# Patient Record
Sex: FEMALE | Race: WHITE | Employment: UNEMPLOYED
[De-identification: names, ages, dates, MRNs, and addresses within clinical notes are randomized per-mention and may not be internally consistent; named-entity substitution may affect disease eponyms.]

---

## 2017-01-18 ENCOUNTER — OFFICE VISIT (OUTPATIENT)
Dept: OBGYN | Facility: CLINIC | Age: 37
End: 2017-01-18

## 2017-01-18 VITALS
SYSTOLIC BLOOD PRESSURE: 128 MMHG | RESPIRATION RATE: 16 BRPM | WEIGHT: 153 LBS | DIASTOLIC BLOOD PRESSURE: 86 MMHG | HEART RATE: 76 BPM | BODY MASS INDEX: 23.96 KG/M2

## 2017-01-18 DIAGNOSIS — N39.0 URINARY TRACT INFECTION, SITE UNSPECIFIED: ICD-10-CM

## 2017-01-18 DIAGNOSIS — N91.2 AMENORRHEA: Primary | ICD-10-CM

## 2017-01-18 DIAGNOSIS — R35.0 FREQUENT URINATION: ICD-10-CM

## 2017-01-18 DIAGNOSIS — Z32.02 NEGATIVE PREGNANCY TEST: ICD-10-CM

## 2017-01-18 LAB
BILIRUBIN, POC: ABNORMAL
BLOOD URINE, POC: ABNORMAL
CLARITY, POC: CLEAR
COLOR, POC: YELLOW
GLUCOSE URINE, POC: ABNORMAL
KETONES, POC: ABNORMAL
LEUKOCYTE EST, POC: ABNORMAL
NITRITE, POC: ABNORMAL
PH, POC: ABNORMAL
PROTEIN, POC: ABNORMAL
SPECIFIC GRAVITY, POC: ABNORMAL
UROBILINOGEN, POC: ABNORMAL

## 2017-01-18 PROCEDURE — 99213 OFFICE O/P EST LOW 20 MIN: CPT | Performed by: SPECIALIST

## 2017-01-18 PROCEDURE — 81002 URINALYSIS NONAUTO W/O SCOPE: CPT | Performed by: SPECIALIST

## 2017-01-18 PROCEDURE — 81025 URINE PREGNANCY TEST: CPT | Performed by: SPECIALIST

## 2017-01-18 RX ORDER — NITROFURANTOIN 25; 75 MG/1; MG/1
100 CAPSULE ORAL 2 TIMES DAILY
Qty: 14 CAPSULE | Refills: 0 | Status: SHIPPED | OUTPATIENT
Start: 2017-01-18 | End: 2017-01-25

## 2017-01-29 ASSESSMENT — ENCOUNTER SYMPTOMS
COUGH: 0
CONSTIPATION: 0
VOMITING: 0
NAUSEA: 0
EYE PAIN: 0
APNEA: 0
ABDOMINAL PAIN: 0
DIARRHEA: 0
ABDOMINAL DISTENTION: 0

## 2017-02-03 ENCOUNTER — OFFICE VISIT (OUTPATIENT)
Dept: OBGYN | Facility: CLINIC | Age: 37
End: 2017-02-03

## 2017-02-03 VITALS
RESPIRATION RATE: 16 BRPM | HEART RATE: 91 BPM | BODY MASS INDEX: 24.28 KG/M2 | WEIGHT: 155 LBS | SYSTOLIC BLOOD PRESSURE: 153 MMHG | DIASTOLIC BLOOD PRESSURE: 75 MMHG

## 2017-02-03 DIAGNOSIS — N64.52 BREAST DISCHARGE: ICD-10-CM

## 2017-02-03 DIAGNOSIS — N91.2 AMENORRHEA: Primary | ICD-10-CM

## 2017-02-03 DIAGNOSIS — R35.0 FREQUENCY OF URINATION: ICD-10-CM

## 2017-02-03 LAB
BILIRUBIN, POC: NORMAL
BLOOD URINE, POC: NORMAL
CLARITY, POC: CLEAR
COLOR, POC: YELLOW
GLUCOSE URINE, POC: NORMAL
KETONES, POC: NORMAL
LEUKOCYTE EST, POC: NORMAL
NITRITE, POC: NORMAL
PH, POC: NORMAL
PROTEIN, POC: NORMAL
SPECIFIC GRAVITY, POC: NORMAL
UROBILINOGEN, POC: NORMAL

## 2017-02-03 PROCEDURE — 99214 OFFICE O/P EST MOD 30 MIN: CPT | Performed by: ADVANCED PRACTICE MIDWIFE

## 2017-02-03 PROCEDURE — 81025 URINE PREGNANCY TEST: CPT | Performed by: ADVANCED PRACTICE MIDWIFE

## 2017-02-03 PROCEDURE — 81002 URINALYSIS NONAUTO W/O SCOPE: CPT | Performed by: ADVANCED PRACTICE MIDWIFE

## 2017-02-03 ASSESSMENT — PATIENT HEALTH QUESTIONNAIRE - PHQ9
SUM OF ALL RESPONSES TO PHQ9 QUESTIONS 1 & 2: 0
1. LITTLE INTEREST OR PLEASURE IN DOING THINGS: 0
2. FEELING DOWN, DEPRESSED OR HOPELESS: 0
SUM OF ALL RESPONSES TO PHQ QUESTIONS 1-9: 0

## 2017-02-10 ENCOUNTER — OFFICE VISIT (OUTPATIENT)
Dept: OBGYN | Facility: CLINIC | Age: 37
End: 2017-02-10

## 2017-02-10 DIAGNOSIS — R10.2 PELVIC PAIN IN FEMALE: Primary | ICD-10-CM

## 2017-02-10 PROCEDURE — 76856 US EXAM PELVIC COMPLETE: CPT | Performed by: SPECIALIST

## 2017-02-10 PROCEDURE — 99213 OFFICE O/P EST LOW 20 MIN: CPT | Performed by: SPECIALIST

## 2017-02-23 ENCOUNTER — OFFICE VISIT (OUTPATIENT)
Dept: FAMILY MEDICINE CLINIC | Facility: CLINIC | Age: 37
End: 2017-02-23

## 2017-02-23 VITALS
OXYGEN SATURATION: 98 % | SYSTOLIC BLOOD PRESSURE: 124 MMHG | BODY MASS INDEX: 23.96 KG/M2 | HEART RATE: 68 BPM | DIASTOLIC BLOOD PRESSURE: 80 MMHG | WEIGHT: 153 LBS

## 2017-02-23 DIAGNOSIS — M75.81 ROTATOR CUFF TENDINITIS, RIGHT: Primary | ICD-10-CM

## 2017-02-23 PROCEDURE — 96372 THER/PROPH/DIAG INJ SC/IM: CPT

## 2017-02-23 PROCEDURE — 99213 OFFICE O/P EST LOW 20 MIN: CPT

## 2017-02-23 RX ORDER — DEXAMETHASONE SODIUM PHOSPHATE 4 MG/ML
4 INJECTION, SOLUTION INTRA-ARTICULAR; INTRALESIONAL; INTRAMUSCULAR; INTRAVENOUS; SOFT TISSUE ONCE
Qty: 1 ML | Refills: 0
Start: 2017-02-23 | End: 2017-02-23

## 2017-02-23 RX ORDER — METHYLPREDNISOLONE 4 MG/1
TABLET ORAL
Qty: 1 KIT | Refills: 0 | Status: SHIPPED | OUTPATIENT
Start: 2017-02-23 | End: 2017-03-01

## 2017-02-23 RX ORDER — KETOROLAC TROMETHAMINE 30 MG/ML
30 INJECTION, SOLUTION INTRAMUSCULAR; INTRAVENOUS ONCE
Status: COMPLETED | OUTPATIENT
Start: 2017-02-23 | End: 2017-02-23

## 2017-02-23 RX ADMIN — KETOROLAC TROMETHAMINE 30 MG: 30 INJECTION, SOLUTION INTRAMUSCULAR; INTRAVENOUS at 10:08

## 2017-02-23 ASSESSMENT — ENCOUNTER SYMPTOMS: RESPIRATORY NEGATIVE: 1

## 2017-02-24 ENCOUNTER — HOSPITAL ENCOUNTER (OUTPATIENT)
Age: 37
Discharge: HOME | End: 2017-02-24
Payer: COMMERCIAL

## 2017-02-24 ENCOUNTER — HOSPITAL ENCOUNTER (OUTPATIENT)
Dept: GENERAL RADIOLOGY | Age: 37
Discharge: HOME | End: 2017-02-24
Payer: COMMERCIAL

## 2017-02-24 ENCOUNTER — OFFICE VISIT (OUTPATIENT)
Dept: OBGYN | Facility: CLINIC | Age: 37
End: 2017-02-24

## 2017-02-24 VITALS
HEART RATE: 95 BPM | WEIGHT: 152 LBS | BODY MASS INDEX: 23.81 KG/M2 | SYSTOLIC BLOOD PRESSURE: 142 MMHG | RESPIRATION RATE: 16 BRPM | DIASTOLIC BLOOD PRESSURE: 80 MMHG

## 2017-02-24 DIAGNOSIS — M75.81 ROTATOR CUFF TENDINITIS, RIGHT: ICD-10-CM

## 2017-02-24 DIAGNOSIS — E28.2 PCO (POLYCYSTIC OVARIES): Primary | ICD-10-CM

## 2017-02-24 PROCEDURE — 73030 X-RAY EXAM OF SHOULDER: CPT

## 2017-02-24 PROCEDURE — 99213 OFFICE O/P EST LOW 20 MIN: CPT | Performed by: SPECIALIST

## 2017-02-24 PROCEDURE — 73030 X-RAY EXAM OF SHOULDER: CPT | Performed by: RADIOLOGY

## 2017-02-24 RX ORDER — METFORMIN HYDROCHLORIDE 500 MG/1
500 TABLET, EXTENDED RELEASE ORAL
Qty: 30 TABLET | Refills: 5 | Status: SHIPPED | OUTPATIENT
Start: 2017-02-24 | End: 2017-07-31 | Stop reason: ALTCHOICE

## 2017-02-24 ASSESSMENT — ENCOUNTER SYMPTOMS
EYE PAIN: 0
VOMITING: 0
APNEA: 0
NAUSEA: 0
ABDOMINAL DISTENTION: 0
DIARRHEA: 0
ABDOMINAL PAIN: 0
COUGH: 0
CONSTIPATION: 0

## 2017-03-09 ENCOUNTER — OFFICE VISIT (OUTPATIENT)
Dept: FAMILY MEDICINE CLINIC | Facility: CLINIC | Age: 37
End: 2017-03-09

## 2017-03-09 VITALS
DIASTOLIC BLOOD PRESSURE: 80 MMHG | OXYGEN SATURATION: 98 % | SYSTOLIC BLOOD PRESSURE: 130 MMHG | HEART RATE: 78 BPM | BODY MASS INDEX: 24.43 KG/M2 | WEIGHT: 156 LBS

## 2017-03-09 DIAGNOSIS — M25.511 CHRONIC RIGHT SHOULDER PAIN: Primary | ICD-10-CM

## 2017-03-09 DIAGNOSIS — G89.29 CHRONIC RIGHT SHOULDER PAIN: Primary | ICD-10-CM

## 2017-03-09 PROCEDURE — 99212 OFFICE O/P EST SF 10 MIN: CPT

## 2017-03-09 ASSESSMENT — ENCOUNTER SYMPTOMS: RESPIRATORY NEGATIVE: 1

## 2017-03-31 ENCOUNTER — OFFICE VISIT (OUTPATIENT)
Dept: FAMILY MEDICINE CLINIC | Age: 37
End: 2017-03-31
Payer: COMMERCIAL

## 2017-03-31 VITALS
BODY MASS INDEX: 24.28 KG/M2 | OXYGEN SATURATION: 96 % | HEART RATE: 74 BPM | WEIGHT: 155 LBS | DIASTOLIC BLOOD PRESSURE: 78 MMHG | SYSTOLIC BLOOD PRESSURE: 126 MMHG

## 2017-03-31 DIAGNOSIS — M25.511 CHRONIC RIGHT SHOULDER PAIN: Primary | ICD-10-CM

## 2017-03-31 DIAGNOSIS — M75.81 ROTATOR CUFF TENDINITIS, RIGHT: ICD-10-CM

## 2017-03-31 DIAGNOSIS — G89.29 CHRONIC RIGHT SHOULDER PAIN: Primary | ICD-10-CM

## 2017-03-31 PROCEDURE — 99212 OFFICE O/P EST SF 10 MIN: CPT

## 2017-03-31 RX ORDER — IBUPROFEN 600 MG/1
600 TABLET ORAL EVERY 6 HOURS PRN
Qty: 120 TABLET | Refills: 5 | Status: ON HOLD | OUTPATIENT
Start: 2017-03-31 | End: 2017-12-19 | Stop reason: HOSPADM

## 2017-07-24 ENCOUNTER — OFFICE VISIT (OUTPATIENT)
Dept: FAMILY MEDICINE CLINIC | Age: 37
End: 2017-07-24
Payer: COMMERCIAL

## 2017-07-24 VITALS
SYSTOLIC BLOOD PRESSURE: 116 MMHG | TEMPERATURE: 97.2 F | DIASTOLIC BLOOD PRESSURE: 78 MMHG | OXYGEN SATURATION: 97 % | BODY MASS INDEX: 22.96 KG/M2 | WEIGHT: 146.6 LBS | HEART RATE: 73 BPM

## 2017-07-24 DIAGNOSIS — R10.2 PELVIC PAIN IN FEMALE: ICD-10-CM

## 2017-07-24 DIAGNOSIS — M19.90 ARTHRITIS: Primary | ICD-10-CM

## 2017-07-24 DIAGNOSIS — L72.3 SEBACEOUS CYST: ICD-10-CM

## 2017-07-24 PROCEDURE — 90732 PPSV23 VACC 2 YRS+ SUBQ/IM: CPT

## 2017-07-24 PROCEDURE — 90471 IMMUNIZATION ADMIN: CPT

## 2017-07-24 PROCEDURE — 99212 OFFICE O/P EST SF 10 MIN: CPT

## 2017-07-26 ENCOUNTER — HOSPITAL ENCOUNTER (OUTPATIENT)
Age: 37
Discharge: HOME | End: 2017-07-26
Payer: COMMERCIAL

## 2017-07-26 ENCOUNTER — HOSPITAL ENCOUNTER (OUTPATIENT)
Dept: GENERAL RADIOLOGY | Age: 37
Discharge: HOME | End: 2017-07-26
Payer: COMMERCIAL

## 2017-07-26 DIAGNOSIS — M19.90 ARTHRITIS: ICD-10-CM

## 2017-07-26 DIAGNOSIS — R10.2 PELVIC PAIN IN FEMALE: ICD-10-CM

## 2017-07-26 LAB
FOLLICLE STIMULATING HORMONE: 14.9 U/L (ref 1.7–21.5)
LH: 23.6 U/L (ref 1–95.6)

## 2017-07-26 PROCEDURE — 83001 ASSAY OF GONADOTROPIN (FSH): CPT

## 2017-07-26 PROCEDURE — 83002 ASSAY OF GONADOTROPIN (LH): CPT

## 2017-07-26 PROCEDURE — 73562 X-RAY EXAM OF KNEE 3: CPT

## 2017-07-26 PROCEDURE — 36415 COLL VENOUS BLD VENIPUNCTURE: CPT

## 2017-07-31 ENCOUNTER — HOSPITAL ENCOUNTER (OUTPATIENT)
Age: 37
Setting detail: SPECIMEN
Discharge: HOME | End: 2017-07-31
Payer: COMMERCIAL

## 2017-07-31 DIAGNOSIS — Z01.419 WELL FEMALE EXAM WITH ROUTINE GYNECOLOGICAL EXAM: ICD-10-CM

## 2017-07-31 DIAGNOSIS — Z11.51 SPECIAL SCREENING EXAMINATION FOR HUMAN PAPILLOMAVIRUS (HPV): ICD-10-CM

## 2017-07-31 PROCEDURE — G0145 SCR C/V CYTO,THINLAYER,RESCR: HCPCS

## 2017-07-31 PROCEDURE — 87624 HPV HI-RISK TYP POOLED RSLT: CPT

## 2017-08-01 LAB
HPV SAMPLE: NORMAL
HPV SOURCE: NORMAL
HPV, GENOTYPE 16: NOT DETECTED
HPV, GENOTYPE 18: NOT DETECTED
HPV, HIGH RISK OTHER: NOT DETECTED
HPV, INTERPRETATION: NORMAL

## 2017-08-02 LAB — CYTOLOGY REPORT: NORMAL

## 2017-08-07 ENCOUNTER — HOSPITAL ENCOUNTER (OUTPATIENT)
Dept: ULTRASOUND IMAGING | Age: 37
Discharge: HOME | End: 2017-08-07
Payer: COMMERCIAL

## 2017-08-07 DIAGNOSIS — N94.6 SEVERE DYSMENORRHEA: ICD-10-CM

## 2017-08-07 DIAGNOSIS — N94.6 DYSMENORRHEA: ICD-10-CM

## 2017-08-07 PROCEDURE — 76856 US EXAM PELVIC COMPLETE: CPT

## 2017-08-07 PROCEDURE — 76830 TRANSVAGINAL US NON-OB: CPT

## 2017-08-16 ENCOUNTER — OFFICE VISIT (OUTPATIENT)
Dept: FAMILY MEDICINE CLINIC | Age: 37
End: 2017-08-16
Payer: COMMERCIAL

## 2017-08-16 VITALS
BODY MASS INDEX: 23.18 KG/M2 | OXYGEN SATURATION: 96 % | WEIGHT: 148 LBS | SYSTOLIC BLOOD PRESSURE: 114 MMHG | DIASTOLIC BLOOD PRESSURE: 72 MMHG | HEART RATE: 74 BPM

## 2017-08-16 DIAGNOSIS — G89.29 CHRONIC KNEE PAIN, UNSPECIFIED LATERALITY: Primary | ICD-10-CM

## 2017-08-16 DIAGNOSIS — M25.569 CHRONIC KNEE PAIN, UNSPECIFIED LATERALITY: Primary | ICD-10-CM

## 2017-08-16 PROCEDURE — 99212 OFFICE O/P EST SF 10 MIN: CPT

## 2017-08-28 ENCOUNTER — TELEPHONE (OUTPATIENT)
Dept: OBGYN CLINIC | Age: 37
End: 2017-08-28

## 2017-08-28 DIAGNOSIS — N64.4 BREAST TENDERNESS IN FEMALE: ICD-10-CM

## 2017-08-28 DIAGNOSIS — N63.10 LUMP OF RIGHT BREAST: Primary | ICD-10-CM

## 2017-09-05 ENCOUNTER — TELEPHONE (OUTPATIENT)
Dept: OBGYN CLINIC | Age: 37
End: 2017-09-05

## 2017-09-05 DIAGNOSIS — R35.0 FREQUENCY OF URINATION: Primary | ICD-10-CM

## 2017-09-15 ENCOUNTER — HOSPITAL ENCOUNTER (OUTPATIENT)
Dept: WOMENS IMAGING | Age: 37
Discharge: HOME | End: 2017-09-15
Payer: COMMERCIAL

## 2017-09-15 ENCOUNTER — HOSPITAL ENCOUNTER (OUTPATIENT)
Age: 37
Discharge: HOME | End: 2017-09-15
Payer: COMMERCIAL

## 2017-09-15 DIAGNOSIS — N63.0 LUMP IN FEMALE BREAST: ICD-10-CM

## 2017-09-15 DIAGNOSIS — N63.10 LUMP OF RIGHT BREAST: ICD-10-CM

## 2017-09-15 DIAGNOSIS — N64.4 BREAST TENDERNESS IN FEMALE: ICD-10-CM

## 2017-09-15 DIAGNOSIS — R35.0 FREQUENCY OF URINATION: ICD-10-CM

## 2017-09-15 LAB
-: ABNORMAL
AMORPHOUS: ABNORMAL
BACTERIA: ABNORMAL
BILIRUBIN URINE: NEGATIVE
CASTS UA: ABNORMAL /LPF
COLOR: YELLOW
COMMENT UA: ABNORMAL
CRYSTALS, UA: ABNORMAL /HPF
EPITHELIAL CELLS UA: ABNORMAL /HPF
GLUCOSE URINE: NEGATIVE
KETONES, URINE: NEGATIVE
LEUKOCYTE ESTERASE, URINE: ABNORMAL
MUCUS: ABNORMAL
NITRITE, URINE: NEGATIVE
OTHER OBSERVATIONS UA: ABNORMAL
PH UA: 6.5 (ref 5–8)
PROTEIN UA: NEGATIVE
RBC UA: ABNORMAL /HPF
RENAL EPITHELIAL, UA: ABNORMAL /HPF
SPECIFIC GRAVITY UA: 1.02 (ref 1–1.03)
TRICHOMONAS: ABNORMAL
TURBIDITY: ABNORMAL
URINE HGB: NEGATIVE
UROBILINOGEN, URINE: NORMAL
WBC UA: ABNORMAL /HPF
YEAST: ABNORMAL

## 2017-09-15 PROCEDURE — 87086 URINE CULTURE/COLONY COUNT: CPT

## 2017-09-15 PROCEDURE — 81001 URINALYSIS AUTO W/SCOPE: CPT

## 2017-09-15 PROCEDURE — G0279 TOMOSYNTHESIS, MAMMO: HCPCS

## 2017-09-15 PROCEDURE — 76642 ULTRASOUND BREAST LIMITED: CPT

## 2017-09-16 LAB
CULTURE: NORMAL
CULTURE: NORMAL
Lab: NORMAL
SPECIMEN DESCRIPTION: NORMAL
SPECIMEN DESCRIPTION: NORMAL
STATUS: NORMAL

## 2017-10-31 ENCOUNTER — OFFICE VISIT (OUTPATIENT)
Dept: OBGYN CLINIC | Age: 37
End: 2017-10-31
Payer: COMMERCIAL

## 2017-10-31 VITALS
HEART RATE: 78 BPM | WEIGHT: 143 LBS | BODY MASS INDEX: 22.44 KG/M2 | SYSTOLIC BLOOD PRESSURE: 122 MMHG | DIASTOLIC BLOOD PRESSURE: 78 MMHG | HEIGHT: 67 IN

## 2017-10-31 DIAGNOSIS — N80.9 ENDOMETRIOSIS DETERMINED BY LAPAROSCOPY: Primary | ICD-10-CM

## 2017-10-31 DIAGNOSIS — N94.6 DYSMENORRHEA: ICD-10-CM

## 2017-10-31 DIAGNOSIS — N92.6 IRREGULAR BLEEDING: ICD-10-CM

## 2017-10-31 DIAGNOSIS — N80.30 ENDOMETRIOSIS OF PERITONEUM: ICD-10-CM

## 2017-10-31 DIAGNOSIS — R10.2 PELVIC PAIN IN FEMALE: ICD-10-CM

## 2017-10-31 DIAGNOSIS — N94.10 DYSPAREUNIA, FEMALE: ICD-10-CM

## 2017-10-31 PROCEDURE — G8427 DOCREV CUR MEDS BY ELIG CLIN: HCPCS | Performed by: OBSTETRICS & GYNECOLOGY

## 2017-10-31 PROCEDURE — 4004F PT TOBACCO SCREEN RCVD TLK: CPT | Performed by: OBSTETRICS & GYNECOLOGY

## 2017-10-31 PROCEDURE — G8484 FLU IMMUNIZE NO ADMIN: HCPCS | Performed by: OBSTETRICS & GYNECOLOGY

## 2017-10-31 PROCEDURE — G8420 CALC BMI NORM PARAMETERS: HCPCS | Performed by: OBSTETRICS & GYNECOLOGY

## 2017-10-31 PROCEDURE — 99214 OFFICE O/P EST MOD 30 MIN: CPT | Performed by: OBSTETRICS & GYNECOLOGY

## 2017-10-31 RX ORDER — LORAZEPAM 1 MG/1
TABLET ORAL
Refills: 0 | COMMUNITY
Start: 2017-10-18 | End: 2018-04-10 | Stop reason: ALTCHOICE

## 2017-10-31 NOTE — PROGRESS NOTES
Pressure Maternal Grandmother     High Cholesterol Maternal Grandmother     Vision Loss Maternal Grandmother     Arthritis Mother     High Blood Pressure Mother     High Cholesterol Mother     Arthritis Father     High Blood Pressure Father     Stroke Father     Miscarriages / [place] Sister     Arthritis Maternal Grandfather     Asthma Maternal Grandfather     Cancer Maternal Grandfather     Heart Disease Maternal Grandfather     High Blood Pressure Maternal Grandfather     High Cholesterol Maternal Grandfather     Arthritis Paternal Grandmother     Cancer Paternal Grandmother     Heart Disease Paternal Grandmother     High Blood Pressure Paternal Grandmother     Arthritis Paternal Grandfather     Cancer Paternal Grandfather     Heart Disease Paternal Grandfather     High Blood Pressure Paternal Grandfather          Social History   Substance Use Topics    Smoking status: Current Every Day Smoker     Packs/day: 1.00     Years: 30.00     Types: Cigarettes    Smokeless tobacco: Never Used    Alcohol use 0.0 oz/week      Comment: rare         MEDICATIONS:  Current Outpatient Prescriptions   Medication Sig Dispense Refill    ibuprofen (ADVIL;MOTRIN) 600 MG tablet Take 1 tablet by mouth every 6 hours as needed for Pain 120 tablet 5    LORazepam (ATIVAN) 1 MG tablet take 1 tablet by mouth 30 TO 40 MINUTES BEFORE APPOINTMENT  0     No current facility-administered medications for this visit.       Facility-Administered Medications Ordered in Other Visits   Medication Dose Route Frequency Provider Last Rate Last Dose    lactated ringers infusion   Intravenous Continuous Bear Reed MD        sodium chloride flush 0.9 % injection 10 mL  10 mL Intravenous 2 times per day Bear Reed MD        sodium chloride flush 0.9 % injection 10 mL  10 mL Intravenous PRN Bear Reed MD        ceFAZolin (ANCEF) 1 g IVPB 50mL minibag D5W  1 g Intravenous Once Pam Baker MD NEGATIVE NEG    pH, UA 6.5 5.0 - 8.0    Protein, UA NEGATIVE NEG    Urobilinogen, Urine Normal NORM    Nitrite, Urine NEGATIVE NEG    Leukocyte Esterase, Urine SMALL (A) NEG    Urinalysis Comments NOT REPORTED    Microscopic Urinalysis   Result Value Ref Range    -          WBC, UA 2 TO 5 /HPF    RBC, UA 0 TO 2 /HPF    Casts UA NOT REPORTED /LPF    Crystals UA NOT REPORTED NONE /HPF    Epithelial Cells UA 10 TO 20 /HPF    Renal Epithelial, Urine NOT REPORTED 0 /HPF    Bacteria, UA FEW (A) NONE    Mucus, UA NOT REPORTED NONE    Trichomonas, UA NOT REPORTED NONE    Amorphous, UA NOT REPORTED NONE    Other Observations UA NOT REPORTED NREQ    Yeast, UA NOT REPORTED NONE     Cytology Report 07/31/2017  9:39 AM - [facility]   (NOTE)   [id number]   [facility]   [facility] PATHOLOG[facility]   ANATOMIC PATHOLOGY   [address] [place] [address], [place] [address]. [place], [address]   [phone]   Fax: [phone]   GYNECOLOGIC CYTOLOGY REPORT     Patient Name: Faith Murdock   MR#: [id number]   Specimen #[id number]   Source:   1: Cervical material, (ThinPrep vial, Imaging-assisted review)     Clinical History   Z01.419 Routine gyn exam without abnormal findings   Co-Test:  ThinPrep Pap with high risk HPV testing   Z11.51 Encounter for screening for HPV   LMP:  7/16/17   INTERPRETATION     Cervical material, (ThinPrep vial, Imaging-assisted review):   Specimen Adequacy:       Satisfactory for evaluation.       - Endocervical/transformation zone component present. Descriptive Diagnosis:       Negative for intraepithelial lesion or malignancy. Comments:       High Risk HPV testing was ordered. Cytotechnologist:   OMER Garcia CD(ASCP)   **Electronically Signed Out**   kemal/8/2/2017           Procedure/Addendum   HPV Procedure Report     Date Ordered:     8/1/2017     Status: Signed   Out       Date Complete:     8/1/2017     By: OMER Adame(ASCP)       Date Reported:     8/2/2017     INTERPRETATION   Roche HPV DNA High Risk                                   HPV Sample               Thin Prep                    (Ref Range)   HPV Type 16               Not Detected                    (Not   Detected)   HPV Type 18               Not Detected                    (Not   Detected)   Other High Risk HPV     Not Detected                    (Not Detected)       This test amplifies and detects DNA of 14 high-risk HPV types   associated with cervical cancer and its precursor lesions (HPV types   16, 18, 31, 33, 35, 39, 45, 51, 52, 56, 58, 59, 66, and 68). Sensitivity may be affected by specimen collection methods, stage of   infection, and the presence of interfering substances.  Results should   be interpreted in conjunction with other available laboratory and   clinical data.  A negative high-risk HPV result does not exclude the   possibility of future cytologic HSIL or underlying CIN2-3 or cancer. This test is intended for medical purposes only and is not valid for   the evaluation of suspected sexual abuse or for other forensic   purposes.                Assessment:  1. Endometriosis determined by laparoscopy     2. Irregular bleeding     3. Dysmenorrhea     4. Dyspareunia, female     5. Pelvic pain in female       Chief Complaint   Patient presents with    Follow-up     sono/pelvic pain         Patient Active Problem List    Diagnosis Date Noted    Chronic knee pain 08/16/2017    Chronic right shoulder pain 03/09/2017    Amenorrhea 02/03/2017    Breast discharge 02/03/2017    Olecranon bursitis of right elbow 09/20/2016    Sebaceous cyst 08/26/2016    Ganglion cyst 08/18/2016    Wrist pain, chronic 08/12/2016    Chronic right-sided low back pain with right-sided sciatica 08/12/2016    Arthralgia of both elbows 06/13/2016    Arthritis 06/13/2016    Pelvic pain in female 05/19/2015       PLAN:  No Follow-up on file.   EMb Hscope 2-4 weeks  Abstain  Lupron initiation  Pt to decide for fertility or definitive surgery RBA reviewed  Return to the office in 2-4 weeks. Counseled on preventative health maintenance follow-up. Smoking cessation reviewed  No orders of the defined types were placed in this encounter. Patient was seen with total face to face time of 25 minutes. More than 50% of this visit was counseling and education regarding The primary encounter diagnosis was Endometriosis determined by laparoscopy. Diagnoses of Irregular bleeding, Dysmenorrhea, Dyspareunia, female, and Pelvic pain in female were also pertinent to this visit. and Follow-up (sono/pelvic pain)   as well as  counseling on preventative health maintenance follow-up.

## 2017-12-05 ENCOUNTER — HOSPITAL ENCOUNTER (OUTPATIENT)
Age: 37
Setting detail: SPECIMEN
Discharge: HOME | End: 2017-12-05
Payer: COMMERCIAL

## 2017-12-05 ENCOUNTER — PROCEDURE VISIT (OUTPATIENT)
Dept: OBGYN CLINIC | Age: 37
End: 2017-12-05
Payer: COMMERCIAL

## 2017-12-05 VITALS
HEIGHT: 67 IN | WEIGHT: 150 LBS | RESPIRATION RATE: 20 BRPM | DIASTOLIC BLOOD PRESSURE: 78 MMHG | SYSTOLIC BLOOD PRESSURE: 116 MMHG | BODY MASS INDEX: 23.54 KG/M2 | HEART RATE: 78 BPM

## 2017-12-05 DIAGNOSIS — N94.10 DYSPAREUNIA, FEMALE: ICD-10-CM

## 2017-12-05 DIAGNOSIS — N94.6 DYSMENORRHEA: Primary | ICD-10-CM

## 2017-12-05 DIAGNOSIS — N80.30 ENDOMETRIOSIS OF PERITONEUM: ICD-10-CM

## 2017-12-05 DIAGNOSIS — Z32.02 NEGATIVE PREGNANCY TEST: ICD-10-CM

## 2017-12-05 DIAGNOSIS — R10.2 PELVIC PAIN IN FEMALE: ICD-10-CM

## 2017-12-05 DIAGNOSIS — N80.9 ENDOMETRIOSIS DETERMINED BY LAPAROSCOPY: ICD-10-CM

## 2017-12-05 DIAGNOSIS — N92.6 IRREGULAR BLEEDING: ICD-10-CM

## 2017-12-05 LAB
CONTROL: NORMAL
PREGNANCY TEST URINE, POC: NEGATIVE

## 2017-12-05 PROCEDURE — 88305 TISSUE EXAM BY PATHOLOGIST: CPT

## 2017-12-05 PROCEDURE — 58558 HYSTEROSCOPY BIOPSY: CPT | Performed by: OBSTETRICS & GYNECOLOGY

## 2017-12-05 PROCEDURE — 81025 URINE PREGNANCY TEST: CPT | Performed by: OBSTETRICS & GYNECOLOGY

## 2017-12-05 RX ORDER — MEDROXYPROGESTERONE ACETATE 2.5 MG/1
2.5 TABLET ORAL DAILY
Qty: 30 TABLET | Refills: 1 | Status: SHIPPED | OUTPATIENT
Start: 2017-12-05 | End: 2018-03-13

## 2017-12-05 NOTE — PROGRESS NOTES
in female 05/19/2015     Priority: High    Chronic knee pain 08/16/2017    Chronic right shoulder pain 03/09/2017    Amenorrhea 02/03/2017    Breast discharge 02/03/2017    Olecranon bursitis of right elbow 09/20/2016    Sebaceous cyst 08/26/2016    Ganglion cyst 08/18/2016    Wrist pain, chronic 08/12/2016    Chronic right-sided low back pain with right-sided sciatica 08/12/2016    Arthralgia of both elbows 06/13/2016    Arthritis 06/13/2016         Procedure Note:  After voiding, the patient returned to the exam room where she was placed in the dorsal- lithotomy position. A bimanual examination was performed without pathological changes from her most recent history and physical examination; . A bi-valve speculum was then placed into the vaginal vault allowing visualization of the cervix. The surgical field was then cleansed with betadine. Under direct visualization an allis was placed on the anterior cervical lip. The uterus was sounded to 7 cm. A 3mm Flexible Hystreroscope was then placed thru the endocervical canal and into the endometrial cavity without any complications. A total of 20 ml of normal saline was instilled to aid in visualization of the endometrial anatomy, video was completed. During the hysteroscopic procedure an endometrial sampling was performed without incident. Pathology is pending. The patient tolerated the procedure well, the Allis was removed off the anterior cervical lip and there was noted to be adequate hemostasis. The patient was given restrictions and will follow-up in the office in 10-14 days. She will report any abdominal pain, heavy vaginal bleeding or a temperature of greater than 100.4. Hysteroscopic Findings:  No septums polyps or fibroids. BL Ostia visualized    Assessment:  1. Dysmenorrhea  [place] HYSTEROSCOPY,W/ENDO BX    Specimen to Pathology Outpatient    medroxyPROGESTERone (PROVERA) 2.5 MG tablet   2.  Negative pregnancy test  POCT urine pregnancy 3. Endometriosis determined by laparoscopy     4. Irregular bleeding     5. Dyspareunia, female     6. Pelvic pain in female     7. Endometriosis of peritoneum       Patient Active Problem List    Diagnosis Date Noted    Endometriosis of peritoneum 9/2015 L-Scope Stage 3-4 10/31/2017     Priority: High    Pelvic pain in female 05/19/2015     Priority: High    Chronic knee pain 08/16/2017    Chronic right shoulder pain 03/09/2017    Amenorrhea 02/03/2017    Breast discharge 02/03/2017    Olecranon bursitis of right elbow 09/20/2016    Sebaceous cyst 08/26/2016    Ganglion cyst 08/18/2016    Wrist pain, chronic 08/12/2016    Chronic right-sided low back pain with right-sided sciatica 08/12/2016    Arthralgia of both elbows 06/13/2016    Arthritis 06/13/2016           Recommendations:  Return to the office for follow-up in 2-4 weeks to review the pathology of the biopsy and for further recommendations. Patient is considering Lupron therapy for 3-5 months then Laparoscopy with FOI and STERLING and chromopertubation. Pt wishes progesterone to stop her menses until her lupron is given. RBA reviewed. Patient to abstain or barrier recs.     Orders Placed This Encounter   Procedures    Specimen to Pathology Outpatient     Order Specific Question:   PREVIOUS BIOPSY     Answer:   No     Order Specific Question:   PREOP DIAGNOSIS     Answer:   dysmennorhea     Order Specific Question:   FROZEN SECTION - NO OR YES/SPECIMEN     Answer:   No    POCT urine pregnancy    [place] HYSTEROSCOPY,W/ENDO BX         Nonadia Ped, DO

## 2017-12-07 LAB — SURGICAL PATHOLOGY REPORT: NORMAL

## 2017-12-18 ENCOUNTER — TELEPHONE (OUTPATIENT)
Dept: OBGYN CLINIC | Age: 37
End: 2017-12-18

## 2017-12-18 NOTE — TELEPHONE ENCOUNTER
Requesting a call from nurse she is on Provera and has questions. She is waiting on Lupron to come in.

## 2017-12-19 ENCOUNTER — HOSPITAL ENCOUNTER (OUTPATIENT)
Age: 37
Setting detail: OUTPATIENT SURGERY
Discharge: HOME | End: 2017-12-19
Attending: SURGERY | Admitting: SURGERY
Payer: COMMERCIAL

## 2017-12-19 VITALS
HEART RATE: 73 BPM | SYSTOLIC BLOOD PRESSURE: 107 MMHG | BODY MASS INDEX: 22.76 KG/M2 | HEIGHT: 67 IN | RESPIRATION RATE: 16 BRPM | WEIGHT: 145 LBS | OXYGEN SATURATION: 100 % | TEMPERATURE: 98.1 F | DIASTOLIC BLOOD PRESSURE: 85 MMHG

## 2017-12-19 PROCEDURE — A6402 STERILE GAUZE <= 16 SQ IN: HCPCS | Performed by: SURGERY

## 2017-12-19 PROCEDURE — A6258 TRANSPARENT FILM >16<=48 IN: HCPCS | Performed by: SURGERY

## 2017-12-19 PROCEDURE — 3600000012 HC SURGERY LEVEL 2 ADDTL 15MIN: Performed by: SURGERY

## 2017-12-19 PROCEDURE — 3600000002 HC SURGERY LEVEL 2 BASE: Performed by: SURGERY

## 2017-12-19 PROCEDURE — 7100000010 HC PHASE II RECOVERY - FIRST 15 MIN: Performed by: SURGERY

## 2017-12-19 PROCEDURE — 2500000003 HC RX 250 WO HCPCS: Performed by: SURGERY

## 2017-12-19 PROCEDURE — 88304 TISSUE EXAM BY PATHOLOGIST: CPT

## 2017-12-19 RX ORDER — CEPHALEXIN 500 MG/1
500 CAPSULE ORAL 3 TIMES DAILY
Qty: 21 CAPSULE | Refills: 0 | Status: SHIPPED | OUTPATIENT
Start: 2017-12-19 | End: 2017-12-26

## 2017-12-19 RX ORDER — OXYCODONE HYDROCHLORIDE AND ACETAMINOPHEN 5; 325 MG/1; MG/1
1 TABLET ORAL EVERY 6 HOURS PRN
Qty: 30 TABLET | Refills: 0 | Status: SHIPPED | OUTPATIENT
Start: 2017-12-19 | End: 2017-12-26

## 2017-12-19 RX ORDER — LIDOCAINE HYDROCHLORIDE AND EPINEPHRINE BITARTRATE 20; .01 MG/ML; MG/ML
INJECTION, SOLUTION SUBCUTANEOUS PRN
Status: DISCONTINUED | OUTPATIENT
Start: 2017-12-19 | End: 2017-12-19 | Stop reason: HOSPADM

## 2017-12-19 ASSESSMENT — PAIN SCALES - GENERAL: PAINLEVEL_OUTOF10: 0

## 2017-12-19 ASSESSMENT — PAIN DESCRIPTION - DESCRIPTORS: DESCRIPTORS: ACHING

## 2017-12-19 ASSESSMENT — PAIN - FUNCTIONAL ASSESSMENT: PAIN_FUNCTIONAL_ASSESSMENT: 0-10

## 2017-12-19 NOTE — FLOWSHEET NOTE
12/19/17 0751   Encounter Summary   Services provided to: Patient and family together   Referral/Consult From: [address] [facility] Significant other;Family members   Continue Visiting (12/19/17)   Complexity of Encounter Moderate   Length of Encounter 15 minutes   Spiritual Assessment Completed Yes   Routine   Type Pre-procedure   Assessment Coping; Anxious; Approachable   Intervention Sustaining presence/ Ministry of presence; Active listening;Nurtured hope   Outcome Expressed gratitude;Comfort   Spiritual/Pentecostal   Type Spiritual support

## 2017-12-19 NOTE — OP NOTE
Operative Note      PATIENT NAME:  Cheko Gomez RECORD NO. [id number]                DATE OF PROCEDURE:  12/19/2017  PRIMARY CARE PHYSICIAN:  Abebe Cuevas MD  PREOPERATIVE DIAGNOSIS:    1.  Subcutaneous nodule, upper back   2. Cyst, right upper thigh  POSTOPERATIVE DIAGNOSIS:  Same  PROCEDURE PERFORMED:    1. Excision subcutaneous nodule, upper back, 1 cm  2. Excision cyst, right thigh, 1 cm   SURGEON:  Rodrick Townsend DO, FACS  ANESTHESIA:  Local  BLOOD LOSS:  Less than 5 ml. SPECIMENS:    1.  Subcutaneous nodule, upper back  2. Cyst, right thigh  COMPLICATIONS:  None immediately appreciated. DISCUSSION:  Brook Desir is a 30y.o. year old female who presented with signs and symptoms of nodule of the upper back causing discomfort. She also complains of cyst in the upper right thigh causing discomfort and drainage. After history and physical examination was performed potential diagnostic and therapeutic modalities were discussed with the patient. Operative and nonoperative management was discussed. Risks, complications, benefits were reviewed. She was given the opportunity to ask questions. Once answered an informed consent was obtained. The patient was brought to the operating room on 12/19/2017. PROCEDURE:   The patient was taken to the operating room and placed on the operating table in the prone position. The upper back was prepped and draped in usual sterile fashion. Timeout was called. After infiltration of lidocaine for local analgesia a transverse incision was made over the palpable lesion and we dissected through the subtenons tissue using electrocautery as well as sharp dissection. The palpable nodule and surrounding tissue were excised. It measured about a centimeter in diameter. The operative field was noted to be hemostatic. It was irrigated and dried.   Subcutaneous tissue was approximated with 3-0 Vicryl and the skin was closed with 2-0 nylon in interrupted fashion. Incision was washed and dried sterilely and a sterile dressing was applied. The patient was then placed in the supine position. The upper right thigh was prepped and draped in usual sterile fashion. Timeout was called. After infiltration of lidocaine for local analgesia an elliptical incision was made to encompass the infected cyst.  We dissected through the subtenons tissue using electrocautery. The cyst and surrounding tissue were removed. The cyst measured about a centimeter in diameter. The skin and subcutaneous tissue was mobilized to allow for tension-free closure. The area was noted to be hemostatic. Subcutaneous tissue was approximated with 3-0 Vicryl sutures and the skin was closed with 2-0 nylon in interrupted fashion. The incision was washed and dried sterilely and a sterile dressing was applied. The patient was taken to recovery in stable condition. Instruments, needles, and sponges were counted twice at the end of the procedure and counts were correct.

## 2017-12-19 NOTE — H&P
tablet 1    ibuprofen (ADVIL;MOTRIN) 600 MG tablet Take 1 tablet by mouth every 6 hours as needed for Pain 120 tablet 5    LORazepam (ATIVAN) 1 MG tablet take 1 tablet by mouth 30 TO 40 MINUTES BEFORE APPOINTMENT  0        General health:  Fairly good. No fever or chills. Skin:  See HPI. HEENT:  No headache, epistaxis or sore throat. Neck:  No pain, stiffness or masses. Cardiovascular/Respiratory system:  No chest pain, palpitation or shortness of breath. Gastrointestinal tract:  Complains of abdominal cramping from menses. nausea, vomiting, diarrhea or constipation. Genitourinary:  No burning on micturition. No hesitancy, urgency, frequency or discoloration of urine. Locomotor:  No bone or joint pains. No swelling. Neuropsychiatric:  No referable complaints. GENERAL PHYSICAL EXAM:     Vitals: /85   Pulse 69   Temp 98.2 °F (36.8 °C) (Oral)   Resp 16   Ht 5' 7\" (1.702 m)   Wt 145 lb (65.8 kg)   LMP 11/21/2017 (Exact Date)   SpO2 99%   BMI 22.71 kg/m²  Body mass index is 22.71 kg/m². GENERAL APPEARANCE:   Destiny Newman is 30 y.o.,  female, not obese, nourished, conscious, alert. Does not appear to be distress or pain at this time. SKIN:  Warm, dry, no cyanosis or jaundice. Raised semi firm areas to left upper back, non tender;  Right redned sl raised area to right upper thigh, tender. HEAD:  Normocephalic, atraumatic, no swelling or tenderness. EYES:  Pupils equal, reactive to light. Eyes wander separately at times. EARS:  No discharge, no marked hearing loss. NOSE:  No rhinorrhea, epistaxis or septal deformity. THROAT:  Not congested. No ulceration bleeding or discharge.                   NECK:  No stiffness, trachea central.  No palpable masses or L.N.                 CHEST:  Symmetrical and equal on expansion. HEART:  RRR S1 > S2. No audible murmurs or gallops. LUNGS:  Equal on expansion, normal breath sounds. No adventitious sounds. ABDOMEN:    Soft on palpation. No localized tenderness. No guarding or rigidity. No palpable organomegaly. LYMPHATICS:  No palpable cervical lymphadenopathy. LOCOMOTOR, BACK AND SPINE:  No tenderness or deformities. EXTREMITIES:  Symmetrical, no pedal edema. Jjs sign negative. No discoloration or ulcerations. NEUROLOGIC:  The patient is conscious, alert, oriented, No apparent focal sensory or motor deficits.                                                                                      PROVISIONAL DIAGNOSES / SURGERY:      BACK UPPER SEBACEOUS CYST   BACK UPPER SEBACEOUS CYST EXCISION- LEFT    Patient Active Problem List    Diagnosis Date Noted    Endometriosis of peritoneum 9/2015 L-Scope Stage 3-4 10/31/2017     Priority: Medium    Chronic knee pain 08/16/2017    Chronic right shoulder pain 03/09/2017    Amenorrhea 02/03/2017    Breast discharge 02/03/2017    Olecranon bursitis of right elbow 09/20/2016    Sebaceous cyst 08/26/2016    Ganglion cyst 08/18/2016    Wrist pain, chronic 08/12/2016    Chronic right-sided low back pain with right-sided sciatica 08/12/2016    Arthralgia of both elbows 06/13/2016    Arthritis 06/13/2016    Pelvic pain in female 05/19/2015           VAZQUEZ GONZALEZ CNP on 12/19/2017 at 7:30 AM

## 2017-12-20 LAB — SURGICAL PATHOLOGY REPORT: NORMAL

## 2018-01-04 ENCOUNTER — TELEPHONE (OUTPATIENT)
Dept: OBGYN CLINIC | Age: 38
End: 2018-01-04

## 2018-01-04 ENCOUNTER — HOSPITAL ENCOUNTER (OUTPATIENT)
Age: 38
Setting detail: SPECIMEN
Discharge: HOME | End: 2018-01-04
Payer: COMMERCIAL

## 2018-01-04 ENCOUNTER — OFFICE VISIT (OUTPATIENT)
Dept: OBGYN CLINIC | Age: 38
End: 2018-01-04
Payer: COMMERCIAL

## 2018-01-04 VITALS
SYSTOLIC BLOOD PRESSURE: 98 MMHG | DIASTOLIC BLOOD PRESSURE: 76 MMHG | HEIGHT: 67 IN | WEIGHT: 150 LBS | RESPIRATION RATE: 20 BRPM | HEART RATE: 78 BPM | BODY MASS INDEX: 23.54 KG/M2

## 2018-01-04 DIAGNOSIS — Z32.02 NEGATIVE PREGNANCY TEST: ICD-10-CM

## 2018-01-04 DIAGNOSIS — R35.0 URINE FREQUENCY: ICD-10-CM

## 2018-01-04 DIAGNOSIS — R10.2 PELVIC PAIN IN FEMALE: ICD-10-CM

## 2018-01-04 DIAGNOSIS — N80.30 ENDOMETRIOSIS OF PERITONEUM: Primary | ICD-10-CM

## 2018-01-04 LAB
-: ABNORMAL
AMORPHOUS: ABNORMAL
BACTERIA: ABNORMAL
BILIRUBIN URINE: NEGATIVE
CASTS UA: ABNORMAL /LPF
COLOR: YELLOW
COMMENT UA: ABNORMAL
CONTROL: NORMAL
CRYSTALS, UA: ABNORMAL /HPF
EPITHELIAL CELLS UA: ABNORMAL /HPF
GLUCOSE URINE: NEGATIVE
KETONES, URINE: NEGATIVE
LEUKOCYTE ESTERASE, URINE: ABNORMAL
MUCUS: ABNORMAL
NITRITE, URINE: NEGATIVE
OTHER OBSERVATIONS UA: ABNORMAL
PH UA: 7 (ref 5–8)
PREGNANCY TEST URINE, POC: NEGATIVE
PROTEIN UA: NEGATIVE
RBC UA: ABNORMAL /HPF
RENAL EPITHELIAL, UA: ABNORMAL /HPF
SPECIFIC GRAVITY UA: 1.02 (ref 1–1.03)
TRICHOMONAS: ABNORMAL
TURBIDITY: ABNORMAL
URINE HGB: NEGATIVE
UROBILINOGEN, URINE: NORMAL
WBC UA: ABNORMAL /HPF
YEAST: ABNORMAL

## 2018-01-04 PROCEDURE — 99213 OFFICE O/P EST LOW 20 MIN: CPT | Performed by: OBSTETRICS & GYNECOLOGY

## 2018-01-04 PROCEDURE — G8427 DOCREV CUR MEDS BY ELIG CLIN: HCPCS | Performed by: OBSTETRICS & GYNECOLOGY

## 2018-01-04 PROCEDURE — G8420 CALC BMI NORM PARAMETERS: HCPCS | Performed by: OBSTETRICS & GYNECOLOGY

## 2018-01-04 PROCEDURE — 4004F PT TOBACCO SCREEN RCVD TLK: CPT | Performed by: OBSTETRICS & GYNECOLOGY

## 2018-01-04 PROCEDURE — 87086 URINE CULTURE/COLONY COUNT: CPT

## 2018-01-04 PROCEDURE — G8484 FLU IMMUNIZE NO ADMIN: HCPCS | Performed by: OBSTETRICS & GYNECOLOGY

## 2018-01-04 PROCEDURE — 81025 URINE PREGNANCY TEST: CPT | Performed by: OBSTETRICS & GYNECOLOGY

## 2018-01-04 PROCEDURE — 81001 URINALYSIS AUTO W/SCOPE: CPT

## 2018-01-04 RX ORDER — LEUPROLIDE ACETATE 3.75 MG
KIT INTRAMUSCULAR
COMMUNITY
Start: 2017-12-20 | End: 2018-01-31 | Stop reason: CLARIF

## 2018-01-04 RX ORDER — DOXYCYCLINE HYCLATE 100 MG/1
CAPSULE ORAL
COMMUNITY
Start: 2017-12-26 | End: 2018-04-10 | Stop reason: ALTCHOICE

## 2018-01-04 NOTE — PROGRESS NOTES
Per Dr Ifeanyi Cavanaugh, pt given Lupron in right hip. Lot # [id number] exp 11/27/19. Negative UPT [id number].
Standing Status:   Future     Standing Expiration Date:   1/4/2019    POCT urine pregnancy       Patient was seen with total face to face time of 15 minutes. More than 50% of this visit was counseling and education regarding The primary encounter diagnosis was Endometriosis of peritoneum 9/2015 L-Scope Stage 3-4. Diagnoses of Pelvic pain in female, Negative pregnancy test, and Urine frequency were also pertinent to this visit. and Follow Up After Procedure   as well as  counseling on preventative health maintenance follow-up.

## 2018-01-05 ENCOUNTER — TELEPHONE (OUTPATIENT)
Dept: OBGYN CLINIC | Age: 38
End: 2018-01-05

## 2018-01-08 ENCOUNTER — TELEPHONE (OUTPATIENT)
Dept: OBGYN CLINIC | Age: 38
End: 2018-01-08

## 2018-01-08 RX ORDER — CIPROFLOXACIN 500 MG/1
500 TABLET, FILM COATED ORAL 2 TIMES DAILY
Qty: 20 TABLET | Refills: 0 | Status: SHIPPED | OUTPATIENT
Start: 2018-01-08 | End: 2018-01-18

## 2018-01-24 ENCOUNTER — TELEPHONE (OUTPATIENT)
Dept: OBGYN CLINIC | Age: 38
End: 2018-01-24

## 2018-01-24 NOTE — TELEPHONE ENCOUNTER
Per nneka pt instructed to d/c provera as instructed. Pt scheduled for lupron injection #2. Pt states she is having some brownish discharge at certain times after her 1st lupron injection. Per nneka the discharge can be normal.  Per nneka pt offered appointment to come in for cultures but declined appointment at this time and stated she will call if symptoms persist or increase.

## 2018-02-12 ENCOUNTER — HOSPITAL ENCOUNTER (OUTPATIENT)
Age: 38
Setting detail: SPECIMEN
Discharge: HOME | End: 2018-02-12
Payer: COMMERCIAL

## 2018-02-12 ENCOUNTER — NURSE ONLY (OUTPATIENT)
Dept: OBGYN CLINIC | Age: 38
End: 2018-02-12
Payer: COMMERCIAL

## 2018-02-12 VITALS
RESPIRATION RATE: 18 BRPM | BODY MASS INDEX: 23.23 KG/M2 | WEIGHT: 148 LBS | SYSTOLIC BLOOD PRESSURE: 102 MMHG | HEIGHT: 67 IN | DIASTOLIC BLOOD PRESSURE: 78 MMHG | HEART RATE: 82 BPM

## 2018-02-12 DIAGNOSIS — N80.30 ENDOMETRIOSIS OF PERITONEUM: Primary | ICD-10-CM

## 2018-02-12 DIAGNOSIS — Z32.02 URINE PREGNANCY TEST NEGATIVE: ICD-10-CM

## 2018-02-12 DIAGNOSIS — R10.2 PELVIC PAIN IN FEMALE: ICD-10-CM

## 2018-02-12 DIAGNOSIS — R35.0 URINARY FREQUENCY: ICD-10-CM

## 2018-02-12 LAB
-: ABNORMAL
AMORPHOUS: ABNORMAL
BACTERIA: ABNORMAL
BILIRUBIN URINE: NEGATIVE
CASTS UA: ABNORMAL /LPF
COLOR: YELLOW
COMMENT UA: ABNORMAL
CONTROL: NORMAL
CRYSTALS, UA: ABNORMAL /HPF
EPITHELIAL CELLS UA: ABNORMAL /HPF
GLUCOSE URINE: NEGATIVE
KETONES, URINE: NEGATIVE
LEUKOCYTE ESTERASE, URINE: NEGATIVE
MUCUS: ABNORMAL
NITRITE, URINE: NEGATIVE
OTHER OBSERVATIONS UA: ABNORMAL
PH UA: 6 (ref 5–8)
PREGNANCY TEST URINE, POC: NEGATIVE
PROTEIN UA: NEGATIVE
RBC UA: ABNORMAL /HPF
RENAL EPITHELIAL, UA: ABNORMAL /HPF
SPECIFIC GRAVITY UA: 1.02 (ref 1–1.03)
TRICHOMONAS: ABNORMAL
TURBIDITY: ABNORMAL
URINE HGB: NEGATIVE
UROBILINOGEN, URINE: NORMAL
WBC UA: ABNORMAL /HPF
YEAST: ABNORMAL

## 2018-02-12 PROCEDURE — 81001 URINALYSIS AUTO W/SCOPE: CPT

## 2018-02-12 PROCEDURE — 81025 URINE PREGNANCY TEST: CPT | Performed by: NURSE PRACTITIONER

## 2018-03-02 ENCOUNTER — TELEPHONE (OUTPATIENT)
Dept: OBGYN CLINIC | Age: 38
End: 2018-03-02

## 2018-03-08 ENCOUNTER — OFFICE VISIT (OUTPATIENT)
Dept: OBGYN CLINIC | Age: 38
End: 2018-03-08
Payer: COMMERCIAL

## 2018-03-08 VITALS
BODY MASS INDEX: 23.54 KG/M2 | HEART RATE: 90 BPM | DIASTOLIC BLOOD PRESSURE: 78 MMHG | HEIGHT: 67 IN | WEIGHT: 150 LBS | SYSTOLIC BLOOD PRESSURE: 118 MMHG

## 2018-03-08 DIAGNOSIS — R10.2 PELVIC PAIN IN FEMALE: ICD-10-CM

## 2018-03-08 DIAGNOSIS — Z79.818 ENCOUNTER FOR MONITORING LUPRON THERAPY: ICD-10-CM

## 2018-03-08 DIAGNOSIS — N80.30 ENDOMETRIOSIS OF PERITONEUM: ICD-10-CM

## 2018-03-08 DIAGNOSIS — Z51.81 ENCOUNTER FOR MONITORING LUPRON THERAPY: ICD-10-CM

## 2018-03-08 DIAGNOSIS — R23.2 HOT FLASHES: Primary | ICD-10-CM

## 2018-03-08 PROCEDURE — G8427 DOCREV CUR MEDS BY ELIG CLIN: HCPCS | Performed by: OBSTETRICS & GYNECOLOGY

## 2018-03-08 PROCEDURE — 99213 OFFICE O/P EST LOW 20 MIN: CPT | Performed by: OBSTETRICS & GYNECOLOGY

## 2018-03-08 PROCEDURE — G8484 FLU IMMUNIZE NO ADMIN: HCPCS | Performed by: OBSTETRICS & GYNECOLOGY

## 2018-03-08 PROCEDURE — G8420 CALC BMI NORM PARAMETERS: HCPCS | Performed by: OBSTETRICS & GYNECOLOGY

## 2018-03-08 PROCEDURE — 4004F PT TOBACCO SCREEN RCVD TLK: CPT | Performed by: OBSTETRICS & GYNECOLOGY

## 2018-03-08 NOTE — PROGRESS NOTES
Henri Krishnan  3/8/2018      Henri Krishnan is a 30 y.o. female       The patient was seen today. She was here to follow-up regarding her labs and diagnostics ordered at her last visit for the diagnosis of:    ICD-10-CM ICD-9-CM    1. Hot flashes R23.2 782.62 estrogen, conjugated,-medroxyprogesterone (PREMPRO) 0.3-1.5 MG per tablet   2. Pelvic pain in female R10.2 625.9    3. Endometriosis of peritoneum 2015 L-Scope Stage 3-4 N80.3 617.3    4. Encounter for monitoring Lupron therapy Z51.81 V58.83 estrogen, conjugated,-medroxyprogesterone (PREMPRO) 0.3-1.5 MG per tablet    Z79.818 V07.59        She does not have any specific chief complaint today. Her bowels are regular and she is voiding without difficulty. Pt with hot flushes lack of sleep and wishes add back tx. She states improved but not relieved with provera alone and wishes combined tx. I reviewed RBA she denies any history in self or family for thromboembolic dz.       Past Medical History:   Diagnosis Date    Anxiety     Asthma     no meds, no inhalers    Chronic back pain     Depression     Endometriosis     Headache     Lazy eye     Osteoarthritis     Ovarian cyst     Seasonal allergies     laundry detergent, bar soap          Past Surgical History:   Procedure Laterality Date    CYST REMOVAL      tailbone    DILATION AND CURETTAGE OF UTERUS      LAPAROSCOPY  9/24/15    operative,hysteroscopy, D&C    TN EXCISION TUMOR SOFT TISSUE BACK/FLANK SUBQ <3CM N/A 2017    UPPER BACK AND RIGHT GROIN SEBACEOUS CYST EXCISION performed by Rico Ingram DO at [address]           Family History   Problem Relation Age of Onset    Cancer Maternal Grandmother     Lung Cancer Maternal Grandmother     Diabetes Maternal Grandmother     Breast Cancer Maternal Grandmother     Arthritis Maternal Grandmother     Heart Disease Maternal Grandmother     High Blood Pressure Maternal Grandmother     High Intravenous 2 times per day Johan Jordan MD        sodium chloride flush 0.9 % injection 10 mL  10 mL Intravenous PRN Bi Barahona MD        ceFAZolin (ANCEF) 1 g IVPB 50mL minibag D5W  1 g Intravenous Once Ples MD Erica             ALLERGIES:  Allergies as of 03/08/2018 - Review Complete 03/08/2018   Allergen Reaction Noted    Vicodin [hydrocodone-acetaminophen] Shortness Of Breath 03/04/2015    Hydrocodone  05/01/2015    Bactrim [sulfamethoxazole-trimethoprim] Nausea And Vomiting 06/13/2016         Blood pressure 118/78, pulse 90, height 5' 7\" (1.702 m), weight 150 lb (68 kg), not currently breastfeeding. Abdomen: Soft non-tender; good bowel sounds. No guarding, rebound or rigidity. No CVA tenderness bilaterally. Extremities: No calf tenderness, DTR 2/4, and No edema bilaterally    Pelvic: Declined         Diagnostics:  No results found. Lab Results:  Results for orders placed or performed during the hospital encounter of 02/12/18   UA W/REFLEX CULTURE   Result Value Ref Range    Color, UA YELLOW YEL    Turbidity UA CLOUDY (A) CLEAR    Glucose, Ur NEGATIVE NEG    Bilirubin Urine NEGATIVE NEG    Ketones, Urine NEGATIVE NEG    Specific Gravity, UA 1.024 1.000 - 1.030    Urine Hgb NEGATIVE NEG    pH, UA 6.0 5.0 - 8.0    Protein, UA NEGATIVE NEG    Urobilinogen, Urine Normal NORM    Nitrite, Urine NEGATIVE NEG    Leukocyte Esterase, Urine NEGATIVE NEG    Urinalysis Comments NOT REPORTED    Microscopic Urinalysis   Result Value Ref Range    -          WBC, UA 2 TO 5 /HPF    RBC, UA 0 TO 2 /HPF    Casts UA NOT REPORTED /LPF    Crystals UA NOT REPORTED NONE /HPF    Epithelial Cells UA 2 TO 5 /HPF    Renal Epithelial, Urine NOT REPORTED 0 /HPF    Bacteria, UA FEW (A) NONE    Mucus, UA 2+ (A) NONE    Trichomonas, UA NOT REPORTED NONE    Amorphous, UA 4+ (A) NONE    Other Observations UA NOT REPORTED NREQ    Yeast, UA NOT REPORTED NONE     Counseling on Hormone Replacement Therapy:     The

## 2018-03-12 ENCOUNTER — NURSE ONLY (OUTPATIENT)
Dept: OBGYN CLINIC | Age: 38
End: 2018-03-12

## 2018-03-12 VITALS
DIASTOLIC BLOOD PRESSURE: 72 MMHG | SYSTOLIC BLOOD PRESSURE: 122 MMHG | WEIGHT: 147 LBS | HEART RATE: 92 BPM | RESPIRATION RATE: 18 BRPM | HEIGHT: 67 IN | BODY MASS INDEX: 23.07 KG/M2

## 2018-03-12 DIAGNOSIS — N80.30 ENDOMETRIOSIS OF PERITONEUM: Primary | ICD-10-CM

## 2018-03-12 PROCEDURE — 99999 PR OFFICE/OUTPT VISIT,PROCEDURE ONLY: CPT | Performed by: ADVANCED PRACTICE MIDWIFE

## 2018-03-12 NOTE — PROGRESS NOTES
Per Rupa Simms, pt given Lupron 3.75 mg injection (NDC:  0731-0802-42;  Lot:  [id number];  Exp:  3/28/2020) in right hip.

## 2018-03-13 ENCOUNTER — HOSPITAL ENCOUNTER (OUTPATIENT)
Dept: PAIN MANAGEMENT | Age: 38
Discharge: HOME | End: 2018-03-13
Payer: COMMERCIAL

## 2018-03-13 VITALS
HEIGHT: 67 IN | TEMPERATURE: 98.6 F | SYSTOLIC BLOOD PRESSURE: 145 MMHG | BODY MASS INDEX: 23.07 KG/M2 | HEART RATE: 68 BPM | RESPIRATION RATE: 14 BRPM | DIASTOLIC BLOOD PRESSURE: 100 MMHG | WEIGHT: 147 LBS

## 2018-03-13 DIAGNOSIS — M54.41 CHRONIC BILATERAL LOW BACK PAIN WITH BILATERAL SCIATICA: Primary | ICD-10-CM

## 2018-03-13 DIAGNOSIS — M54.42 CHRONIC BILATERAL LOW BACK PAIN WITH BILATERAL SCIATICA: Primary | ICD-10-CM

## 2018-03-13 DIAGNOSIS — G89.29 CHRONIC BILATERAL LOW BACK PAIN WITH BILATERAL SCIATICA: Primary | ICD-10-CM

## 2018-03-13 PROCEDURE — 99203 OFFICE O/P NEW LOW 30 MIN: CPT

## 2018-03-13 PROCEDURE — 99244 OFF/OP CNSLTJ NEW/EST MOD 40: CPT | Performed by: ANESTHESIOLOGY

## 2018-03-13 RX ORDER — BUTALBITAL, ACETAMINOPHEN AND CAFFEINE 50; 325; 40 MG/1; MG/1; MG/1
1 TABLET ORAL EVERY 4 HOURS PRN
COMMUNITY

## 2018-03-13 RX ORDER — IBUPROFEN 600 MG/1
600 TABLET ORAL EVERY 6 HOURS PRN
Status: ON HOLD | COMMUNITY
End: 2018-05-17

## 2018-03-13 RX ORDER — TIZANIDINE 4 MG/1
4 TABLET ORAL EVERY 12 HOURS PRN
Qty: 60 TABLET | Refills: 0 | Status: SHIPPED | OUTPATIENT
Start: 2018-03-13 | End: 2018-04-12 | Stop reason: SDUPTHER

## 2018-03-13 ASSESSMENT — ENCOUNTER SYMPTOMS
HEMOPTYSIS: 0
BACK PAIN: 1
COUGH: 0
NAUSEA: 0
DOUBLE VISION: 0
BLURRED VISION: 0

## 2018-03-13 ASSESSMENT — PAIN SCALES - GENERAL: PAINLEVEL_OUTOF10: 7

## 2018-03-13 ASSESSMENT — PAIN DESCRIPTION - ORIENTATION: ORIENTATION: MID;LOWER;LEFT;RIGHT

## 2018-03-13 ASSESSMENT — PAIN DESCRIPTION - FREQUENCY: FREQUENCY: CONTINUOUS

## 2018-03-13 ASSESSMENT — PAIN DESCRIPTION - LOCATION: LOCATION: BACK;LEG;TOE (COMMENT WHICH ONE)

## 2018-03-13 NOTE — PROGRESS NOTES
movement disorder noted, neck supple without rigidity, cranial nerves II through XII intact, motor and sensory grossly normal bilaterally, normal muscle tone, no tremors, strength 5/5. Assessment & Plan   This is a 68-year-old woman with history of chronic lower back pain located in the lumbar area across midline with intermittent radiation down both legs all the way to the foot. Associated symptoms include bilateral leg numbness tingling and burning sensation. Right side is more affected than the left side. Onset of symptoms several years ago symptoms of been progressively worsening over years. She describes the pain as a constant shooting aching burning sensation with an average intensity of 7-8/10. Alleviating factors include rest and heat application. Aggravating factors include daily physical routine life standing and lifting bending. Patient denies any loss of bladder or bowel control. No history of fever chills or weight loss. Patient completed physical therapy in December 2017 attending 17 sessions with no improvement in symptoms. No previous lumbar spine surgery  No previous lumbar spine injection  No previous lumbar spine MRI. Patient had an plain lumbar spine x-ray in 2016 which was a normal study. 1. Chronic bilateral low back pain with bilateral sciatica        Chronic lower back and bilateral MRI radicular pain progressively worsening unresponsive to conservative measures with rest physical therapy and medications including anti-inflammatory drugs and muscle relaxant. Pain is constant and significantly interfering with daily life activities. Her presentation is typical for lumbar disc disease and lumbar radiculitis. I will recommend to obtain a lumbar spine MRI. Next    We will obtain physical therapy recommended and we'll submit for prior authorization of MRI lumbar spine without contrast.  In  In the interim I will start her on muscle relaxant tizanidine 4 mg twice a day.   We

## 2018-04-05 ENCOUNTER — HOSPITAL ENCOUNTER (OUTPATIENT)
Dept: MRI IMAGING | Facility: CLINIC | Age: 38
Discharge: HOME | End: 2018-04-07
Payer: COMMERCIAL

## 2018-04-05 DIAGNOSIS — M54.42 CHRONIC BILATERAL LOW BACK PAIN WITH BILATERAL SCIATICA: ICD-10-CM

## 2018-04-05 DIAGNOSIS — M54.41 CHRONIC BILATERAL LOW BACK PAIN WITH BILATERAL SCIATICA: ICD-10-CM

## 2018-04-05 DIAGNOSIS — G89.29 CHRONIC BILATERAL LOW BACK PAIN WITH BILATERAL SCIATICA: ICD-10-CM

## 2018-04-05 PROCEDURE — 72148 MRI LUMBAR SPINE W/O DYE: CPT

## 2018-04-10 ENCOUNTER — HOSPITAL ENCOUNTER (OUTPATIENT)
Dept: PAIN MANAGEMENT | Age: 38
Discharge: HOME | End: 2018-04-10
Payer: COMMERCIAL

## 2018-04-10 VITALS
RESPIRATION RATE: 16 BRPM | SYSTOLIC BLOOD PRESSURE: 151 MMHG | WEIGHT: 147 LBS | TEMPERATURE: 98.4 F | DIASTOLIC BLOOD PRESSURE: 90 MMHG | HEART RATE: 64 BPM | BODY MASS INDEX: 23.07 KG/M2 | HEIGHT: 67 IN

## 2018-04-10 DIAGNOSIS — G89.29 CHRONIC BILATERAL LOW BACK PAIN WITH BILATERAL SCIATICA: Primary | ICD-10-CM

## 2018-04-10 DIAGNOSIS — M54.42 CHRONIC BILATERAL LOW BACK PAIN WITH BILATERAL SCIATICA: Primary | ICD-10-CM

## 2018-04-10 DIAGNOSIS — M54.41 CHRONIC BILATERAL LOW BACK PAIN WITH BILATERAL SCIATICA: Primary | ICD-10-CM

## 2018-04-10 PROCEDURE — 99214 OFFICE O/P EST MOD 30 MIN: CPT | Performed by: ANESTHESIOLOGY

## 2018-04-10 PROCEDURE — 99214 OFFICE O/P EST MOD 30 MIN: CPT

## 2018-04-10 RX ORDER — FLUTICASONE PROPIONATE 50 MCG
1 SPRAY, SUSPENSION (ML) NASAL 2 TIMES DAILY
COMMUNITY

## 2018-04-10 RX ORDER — MECLIZINE HCL 12.5 MG/1
12.5 TABLET ORAL 3 TIMES DAILY PRN
COMMUNITY

## 2018-04-10 RX ORDER — LORATADINE 10 MG/1
10 TABLET ORAL DAILY
COMMUNITY

## 2018-04-10 ASSESSMENT — ENCOUNTER SYMPTOMS
BACK PAIN: 1
GASTROINTESTINAL NEGATIVE: 1

## 2018-04-10 ASSESSMENT — PAIN DESCRIPTION - ORIENTATION: ORIENTATION: LOWER;RIGHT

## 2018-04-10 ASSESSMENT — PAIN DESCRIPTION - PAIN TYPE: TYPE: CHRONIC PAIN

## 2018-04-10 ASSESSMENT — PAIN SCALES - GENERAL: PAINLEVEL_OUTOF10: 8

## 2018-04-10 ASSESSMENT — PAIN DESCRIPTION - FREQUENCY: FREQUENCY: CONTINUOUS

## 2018-04-10 ASSESSMENT — PAIN DESCRIPTION - LOCATION: LOCATION: BACK

## 2018-04-10 ASSESSMENT — PAIN DESCRIPTION - ONSET: ONSET: ON-GOING

## 2018-04-12 RX ORDER — TIZANIDINE 4 MG/1
4 TABLET ORAL EVERY 12 HOURS PRN
Qty: 60 TABLET | Refills: 0 | Status: SHIPPED | OUTPATIENT
Start: 2018-04-12

## 2018-04-18 ENCOUNTER — HOSPITAL ENCOUNTER (OUTPATIENT)
Dept: PAIN MANAGEMENT | Age: 38
Discharge: HOME | End: 2018-04-18
Payer: COMMERCIAL

## 2018-04-18 ENCOUNTER — NURSE ONLY (OUTPATIENT)
Dept: OBGYN CLINIC | Age: 38
End: 2018-04-18
Payer: COMMERCIAL

## 2018-04-18 VITALS
HEIGHT: 67 IN | BODY MASS INDEX: 23.15 KG/M2 | WEIGHT: 147.5 LBS | DIASTOLIC BLOOD PRESSURE: 74 MMHG | SYSTOLIC BLOOD PRESSURE: 122 MMHG | HEART RATE: 68 BPM

## 2018-04-18 VITALS
HEIGHT: 67 IN | WEIGHT: 147 LBS | RESPIRATION RATE: 18 BRPM | HEART RATE: 70 BPM | BODY MASS INDEX: 23.07 KG/M2 | TEMPERATURE: 98.2 F | SYSTOLIC BLOOD PRESSURE: 131 MMHG | DIASTOLIC BLOOD PRESSURE: 72 MMHG

## 2018-04-18 DIAGNOSIS — Z32.02 NEGATIVE PREGNANCY TEST: ICD-10-CM

## 2018-04-18 DIAGNOSIS — N80.30 ENDOMETRIOSIS OF PERITONEUM: Primary | ICD-10-CM

## 2018-04-18 LAB
CONTROL: NORMAL
PREGNANCY TEST URINE, POC: NEGATIVE

## 2018-04-18 PROCEDURE — 99212 OFFICE O/P EST SF 10 MIN: CPT | Performed by: ANESTHESIOLOGY

## 2018-04-18 PROCEDURE — 81025 URINE PREGNANCY TEST: CPT | Performed by: NURSE PRACTITIONER

## 2018-04-18 PROCEDURE — G0283 ELEC STIM OTHER THAN WOUND: HCPCS

## 2018-04-18 ASSESSMENT — PAIN DESCRIPTION - LOCATION: LOCATION: BACK

## 2018-04-18 ASSESSMENT — PAIN DESCRIPTION - PROGRESSION: CLINICAL_PROGRESSION: GRADUALLY WORSENING

## 2018-04-18 ASSESSMENT — PAIN DESCRIPTION - FREQUENCY: FREQUENCY: CONTINUOUS

## 2018-04-18 ASSESSMENT — PAIN SCALES - GENERAL: PAINLEVEL_OUTOF10: 8

## 2018-04-18 ASSESSMENT — PAIN DESCRIPTION - PAIN TYPE: TYPE: CHRONIC PAIN

## 2018-04-18 ASSESSMENT — PAIN DESCRIPTION - ORIENTATION: ORIENTATION: MID;LOWER;RIGHT;LEFT

## 2018-04-18 ASSESSMENT — PAIN DESCRIPTION - ONSET: ONSET: ON-GOING

## 2018-04-24 ENCOUNTER — HOSPITAL ENCOUNTER (OUTPATIENT)
Dept: PAIN MANAGEMENT | Age: 38
Discharge: HOME | End: 2018-04-24
Payer: COMMERCIAL

## 2018-04-24 VITALS
HEART RATE: 89 BPM | TEMPERATURE: 98.5 F | RESPIRATION RATE: 18 BRPM | SYSTOLIC BLOOD PRESSURE: 140 MMHG | DIASTOLIC BLOOD PRESSURE: 100 MMHG

## 2018-04-24 DIAGNOSIS — G89.29 CHRONIC BILATERAL LOW BACK PAIN WITH BILATERAL SCIATICA: Primary | ICD-10-CM

## 2018-04-24 DIAGNOSIS — M54.42 CHRONIC BILATERAL LOW BACK PAIN WITH BILATERAL SCIATICA: Primary | ICD-10-CM

## 2018-04-24 DIAGNOSIS — M54.41 CHRONIC BILATERAL LOW BACK PAIN WITH BILATERAL SCIATICA: Primary | ICD-10-CM

## 2018-04-24 PROCEDURE — 99213 OFFICE O/P EST LOW 20 MIN: CPT | Performed by: ANESTHESIOLOGY

## 2018-04-24 PROCEDURE — 99214 OFFICE O/P EST MOD 30 MIN: CPT

## 2018-04-24 ASSESSMENT — PAIN DESCRIPTION - FREQUENCY: FREQUENCY: CONTINUOUS

## 2018-04-24 ASSESSMENT — ENCOUNTER SYMPTOMS
BACK PAIN: 1
RESPIRATORY NEGATIVE: 1

## 2018-04-24 ASSESSMENT — PAIN DESCRIPTION - PROGRESSION: CLINICAL_PROGRESSION: GRADUALLY WORSENING

## 2018-04-24 ASSESSMENT — PAIN DESCRIPTION - LOCATION: LOCATION: BACK

## 2018-04-24 ASSESSMENT — PAIN SCALES - GENERAL: PAINLEVEL_OUTOF10: 10

## 2018-04-24 ASSESSMENT — PAIN DESCRIPTION - ONSET: ONSET: ON-GOING

## 2018-04-24 ASSESSMENT — PAIN DESCRIPTION - PAIN TYPE: TYPE: CHRONIC PAIN

## 2018-04-24 ASSESSMENT — PAIN DESCRIPTION - ORIENTATION: ORIENTATION: RIGHT;LEFT;MID;LOWER

## 2018-04-26 ENCOUNTER — OFFICE VISIT (OUTPATIENT)
Dept: OBGYN CLINIC | Age: 38
End: 2018-04-26
Payer: COMMERCIAL

## 2018-04-26 ENCOUNTER — PREP FOR PROCEDURE (OUTPATIENT)
Dept: OBGYN CLINIC | Age: 38
End: 2018-04-26

## 2018-04-26 VITALS
DIASTOLIC BLOOD PRESSURE: 80 MMHG | SYSTOLIC BLOOD PRESSURE: 122 MMHG | HEIGHT: 67 IN | HEART RATE: 78 BPM | WEIGHT: 149 LBS | BODY MASS INDEX: 23.39 KG/M2

## 2018-04-26 DIAGNOSIS — N80.30 ENDOMETRIOSIS OF PERITONEUM: Primary | ICD-10-CM

## 2018-04-26 DIAGNOSIS — Z01.818 PREOP TESTING: Primary | ICD-10-CM

## 2018-04-26 DIAGNOSIS — R10.2 PELVIC PAIN IN FEMALE: ICD-10-CM

## 2018-04-26 PROCEDURE — G8427 DOCREV CUR MEDS BY ELIG CLIN: HCPCS | Performed by: OBSTETRICS & GYNECOLOGY

## 2018-04-26 PROCEDURE — 4004F PT TOBACCO SCREEN RCVD TLK: CPT | Performed by: OBSTETRICS & GYNECOLOGY

## 2018-04-26 PROCEDURE — G8420 CALC BMI NORM PARAMETERS: HCPCS | Performed by: OBSTETRICS & GYNECOLOGY

## 2018-04-26 PROCEDURE — 99213 OFFICE O/P EST LOW 20 MIN: CPT | Performed by: OBSTETRICS & GYNECOLOGY

## 2018-04-26 RX ORDER — SODIUM CHLORIDE 0.9 % (FLUSH) 0.9 %
10 SYRINGE (ML) INJECTION EVERY 12 HOURS SCHEDULED
Status: CANCELLED | OUTPATIENT
Start: 2018-04-26

## 2018-04-26 RX ORDER — SODIUM CHLORIDE 0.9 % (FLUSH) 0.9 %
10 SYRINGE (ML) INJECTION PRN
Status: CANCELLED | OUTPATIENT
Start: 2018-04-26

## 2018-04-26 RX ORDER — SODIUM CHLORIDE, SODIUM LACTATE, POTASSIUM CHLORIDE, CALCIUM CHLORIDE 600; 310; 30; 20 MG/100ML; MG/100ML; MG/100ML; MG/100ML
INJECTION, SOLUTION INTRAVENOUS CONTINUOUS
Status: CANCELLED | OUTPATIENT
Start: 2018-04-26

## 2018-05-08 ENCOUNTER — HOSPITAL ENCOUNTER (OUTPATIENT)
Dept: GENERAL RADIOLOGY | Age: 38
Discharge: HOME | End: 2018-05-10
Payer: COMMERCIAL

## 2018-05-08 ENCOUNTER — HOSPITAL ENCOUNTER (OUTPATIENT)
Dept: PREADMISSION TESTING | Age: 38
Discharge: HOME | End: 2018-05-12
Payer: COMMERCIAL

## 2018-05-08 VITALS
RESPIRATION RATE: 16 BRPM | HEART RATE: 77 BPM | HEIGHT: 68 IN | SYSTOLIC BLOOD PRESSURE: 126 MMHG | BODY MASS INDEX: 22.43 KG/M2 | OXYGEN SATURATION: 99 % | TEMPERATURE: 98 F | DIASTOLIC BLOOD PRESSURE: 87 MMHG | WEIGHT: 148 LBS

## 2018-05-08 DIAGNOSIS — Z01.818 PREOP TESTING: ICD-10-CM

## 2018-05-08 LAB
ABO/RH: NORMAL
ABSOLUTE EOS #: 0.5 K/UL (ref 0–0.4)
ABSOLUTE IMMATURE GRANULOCYTE: ABNORMAL K/UL (ref 0–0.3)
ABSOLUTE LYMPH #: 1.9 K/UL (ref 1–4.8)
ABSOLUTE MONO #: 0.5 K/UL (ref 0.1–1.3)
ANTIBODY SCREEN: NEGATIVE
ARM BAND NUMBER: NORMAL
BASOPHILS # BLD: 1 % (ref 0–2)
BASOPHILS ABSOLUTE: 0.1 K/UL (ref 0–0.2)
BILIRUBIN URINE: NEGATIVE
COLOR: YELLOW
COMMENT UA: NORMAL
DIFFERENTIAL TYPE: ABNORMAL
EKG ATRIAL RATE: 59 BPM
EKG P AXIS: 64 DEGREES
EKG P-R INTERVAL: 154 MS
EKG Q-T INTERVAL: 422 MS
EKG QRS DURATION: 84 MS
EKG QTC CALCULATION (BAZETT): 417 MS
EKG R AXIS: 87 DEGREES
EKG T AXIS: 67 DEGREES
EKG VENTRICULAR RATE: 59 BPM
EOSINOPHILS RELATIVE PERCENT: 8 % (ref 0–4)
EXPIRATION DATE: NORMAL
GLUCOSE URINE: NEGATIVE
HCG QUANTITATIVE: <1 IU/L
HCT VFR BLD CALC: 46.1 % (ref 36–46)
HEMOGLOBIN: 15.7 G/DL (ref 12–16)
IMMATURE GRANULOCYTES: ABNORMAL %
KETONES, URINE: NEGATIVE
LEUKOCYTE ESTERASE, URINE: NEGATIVE
LYMPHOCYTES # BLD: 28 % (ref 24–44)
MCH RBC QN AUTO: 31.3 PG (ref 26–34)
MCHC RBC AUTO-ENTMCNC: 34 G/DL (ref 31–37)
MCV RBC AUTO: 92.1 FL (ref 80–100)
MONOCYTES # BLD: 8 % (ref 1–7)
NITRITE, URINE: NEGATIVE
NRBC AUTOMATED: ABNORMAL PER 100 WBC
PDW BLD-RTO: 13.3 % (ref 11.5–14.9)
PH UA: 6 (ref 5–8)
PLATELET # BLD: 222 K/UL (ref 150–450)
PLATELET ESTIMATE: ABNORMAL
PMV BLD AUTO: 9.1 FL (ref 6–12)
PROTEIN UA: NEGATIVE
RBC # BLD: 5 M/UL (ref 4–5.2)
RBC # BLD: ABNORMAL 10*6/UL
SEG NEUTROPHILS: 55 % (ref 36–66)
SEGMENTED NEUTROPHILS ABSOLUTE COUNT: 3.7 K/UL (ref 1.3–9.1)
SPECIFIC GRAVITY UA: 1.02 (ref 1–1.03)
TURBIDITY: CLEAR
URINE HGB: NEGATIVE
UROBILINOGEN, URINE: NORMAL
WBC # BLD: 6.8 K/UL (ref 3.5–11)
WBC # BLD: ABNORMAL 10*3/UL

## 2018-05-08 PROCEDURE — 86850 RBC ANTIBODY SCREEN: CPT

## 2018-05-08 PROCEDURE — 85025 COMPLETE CBC W/AUTO DIFF WBC: CPT

## 2018-05-08 PROCEDURE — 36415 COLL VENOUS BLD VENIPUNCTURE: CPT

## 2018-05-08 PROCEDURE — 86900 BLOOD TYPING SEROLOGIC ABO: CPT

## 2018-05-08 PROCEDURE — 87086 URINE CULTURE/COLONY COUNT: CPT

## 2018-05-08 PROCEDURE — 86901 BLOOD TYPING SEROLOGIC RH(D): CPT

## 2018-05-08 PROCEDURE — 84702 CHORIONIC GONADOTROPIN TEST: CPT

## 2018-05-08 PROCEDURE — 81003 URINALYSIS AUTO W/O SCOPE: CPT

## 2018-05-08 PROCEDURE — 71046 X-RAY EXAM CHEST 2 VIEWS: CPT

## 2018-05-08 PROCEDURE — 93005 ELECTROCARDIOGRAM TRACING: CPT

## 2018-05-08 RX ORDER — ONDANSETRON 4 MG/1
4 TABLET, FILM COATED ORAL EVERY 8 HOURS PRN
COMMUNITY
End: 2020-09-16 | Stop reason: ALTCHOICE

## 2018-05-08 ASSESSMENT — PAIN DESCRIPTION - DIRECTION: RADIATING_TOWARDS: DOWN LEGS

## 2018-05-08 ASSESSMENT — PAIN DESCRIPTION - ONSET: ONSET: ON-GOING

## 2018-05-08 ASSESSMENT — PAIN DESCRIPTION - PAIN TYPE: TYPE: CHRONIC PAIN

## 2018-05-08 ASSESSMENT — PAIN SCALES - GENERAL: PAINLEVEL_OUTOF10: 8

## 2018-05-08 ASSESSMENT — PAIN DESCRIPTION - ORIENTATION: ORIENTATION: RIGHT;LEFT;MID;LOWER

## 2018-05-08 ASSESSMENT — PAIN DESCRIPTION - LOCATION: LOCATION: BACK;HIP

## 2018-05-08 ASSESSMENT — PAIN DESCRIPTION - FREQUENCY: FREQUENCY: CONTINUOUS

## 2018-05-08 ASSESSMENT — PAIN DESCRIPTION - PROGRESSION: CLINICAL_PROGRESSION: GRADUALLY WORSENING

## 2018-05-10 ENCOUNTER — ANESTHESIA EVENT (OUTPATIENT)
Dept: OPERATING ROOM | Age: 38
End: 2018-05-10
Payer: COMMERCIAL

## 2018-05-10 RX ORDER — SODIUM CHLORIDE 0.9 % (FLUSH) 0.9 %
10 SYRINGE (ML) INJECTION PRN
Status: CANCELLED | OUTPATIENT
Start: 2018-05-10

## 2018-05-10 RX ORDER — LIDOCAINE HYDROCHLORIDE 10 MG/ML
1 INJECTION, SOLUTION EPIDURAL; INFILTRATION; INTRACAUDAL; PERINEURAL
Status: CANCELLED | OUTPATIENT
Start: 2018-05-10 | End: 2018-05-10

## 2018-05-10 RX ORDER — SODIUM CHLORIDE 0.9 % (FLUSH) 0.9 %
10 SYRINGE (ML) INJECTION EVERY 12 HOURS SCHEDULED
Status: CANCELLED | OUTPATIENT
Start: 2018-05-10

## 2018-05-10 RX ORDER — SODIUM CHLORIDE, SODIUM LACTATE, POTASSIUM CHLORIDE, CALCIUM CHLORIDE 600; 310; 30; 20 MG/100ML; MG/100ML; MG/100ML; MG/100ML
INJECTION, SOLUTION INTRAVENOUS CONTINUOUS
Status: CANCELLED | OUTPATIENT
Start: 2018-05-10

## 2018-05-17 ENCOUNTER — TELEPHONE (OUTPATIENT)
Dept: OBGYN CLINIC | Age: 38
End: 2018-05-17

## 2018-05-17 ENCOUNTER — HOSPITAL ENCOUNTER (OUTPATIENT)
Age: 38
Setting detail: OUTPATIENT SURGERY
Discharge: HOME | End: 2018-05-17
Attending: OBSTETRICS & GYNECOLOGY | Admitting: OBSTETRICS & GYNECOLOGY
Payer: COMMERCIAL

## 2018-05-17 ENCOUNTER — ANESTHESIA (OUTPATIENT)
Dept: OPERATING ROOM | Age: 38
End: 2018-05-17
Payer: COMMERCIAL

## 2018-05-17 VITALS — OXYGEN SATURATION: 100 % | SYSTOLIC BLOOD PRESSURE: 157 MMHG | TEMPERATURE: 96.8 F | DIASTOLIC BLOOD PRESSURE: 98 MMHG

## 2018-05-17 VITALS
SYSTOLIC BLOOD PRESSURE: 130 MMHG | OXYGEN SATURATION: 99 % | HEIGHT: 68 IN | HEART RATE: 59 BPM | DIASTOLIC BLOOD PRESSURE: 83 MMHG | RESPIRATION RATE: 18 BRPM | TEMPERATURE: 99 F | WEIGHT: 148 LBS | BODY MASS INDEX: 22.43 KG/M2

## 2018-05-17 PROBLEM — Z98.890 S/P LAPAROSCOPY: Status: ACTIVE | Noted: 2018-05-17

## 2018-05-17 LAB
-: NORMAL
HCG, PREGNANCY URINE (POC): NEGATIVE

## 2018-05-17 PROCEDURE — 3700000000 HC ANESTHESIA ATTENDED CARE: Performed by: OBSTETRICS & GYNECOLOGY

## 2018-05-17 PROCEDURE — 7100000001 HC PACU RECOVERY - ADDTL 15 MIN: Performed by: OBSTETRICS & GYNECOLOGY

## 2018-05-17 PROCEDURE — 2580000003 HC RX 258: Performed by: ANESTHESIOLOGY

## 2018-05-17 PROCEDURE — 3600000003 HC SURGERY LEVEL 3 BASE: Performed by: OBSTETRICS & GYNECOLOGY

## 2018-05-17 PROCEDURE — 58662 LAPAROSCOPY EXCISE LESIONS: CPT | Performed by: OBSTETRICS & GYNECOLOGY

## 2018-05-17 PROCEDURE — 58350 REOPEN FALLOPIAN TUBE: CPT | Performed by: OBSTETRICS & GYNECOLOGY

## 2018-05-17 PROCEDURE — 3700000001 HC ADD 15 MINUTES (ANESTHESIA): Performed by: OBSTETRICS & GYNECOLOGY

## 2018-05-17 PROCEDURE — 2500000003 HC RX 250 WO HCPCS: Performed by: OBSTETRICS & GYNECOLOGY

## 2018-05-17 PROCEDURE — 7100000000 HC PACU RECOVERY - FIRST 15 MIN: Performed by: OBSTETRICS & GYNECOLOGY

## 2018-05-17 PROCEDURE — 84703 CHORIONIC GONADOTROPIN ASSAY: CPT

## 2018-05-17 PROCEDURE — 6360000002 HC RX W HCPCS: Performed by: NURSE ANESTHETIST, CERTIFIED REGISTERED

## 2018-05-17 PROCEDURE — 7100000030 HC ASPR PHASE II RECOVERY - FIRST 15 MIN: Performed by: OBSTETRICS & GYNECOLOGY

## 2018-05-17 PROCEDURE — 2500000003 HC RX 250 WO HCPCS: Performed by: NURSE ANESTHETIST, CERTIFIED REGISTERED

## 2018-05-17 PROCEDURE — 6360000002 HC RX W HCPCS: Performed by: ANESTHESIOLOGY

## 2018-05-17 PROCEDURE — 7100000031 HC ASPR PHASE II RECOVERY - ADDTL 15 MIN: Performed by: OBSTETRICS & GYNECOLOGY

## 2018-05-17 PROCEDURE — 2720000010 HC SURG SUPPLY STERILE: Performed by: OBSTETRICS & GYNECOLOGY

## 2018-05-17 PROCEDURE — 3600000013 HC SURGERY LEVEL 3 ADDTL 15MIN: Performed by: OBSTETRICS & GYNECOLOGY

## 2018-05-17 RX ORDER — 0.9 % SODIUM CHLORIDE 0.9 %
500 INTRAVENOUS SOLUTION INTRAVENOUS
Status: DISCONTINUED | OUTPATIENT
Start: 2018-05-17 | End: 2018-05-17 | Stop reason: HOSPADM

## 2018-05-17 RX ORDER — DEXAMETHASONE SODIUM PHOSPHATE 4 MG/ML
INJECTION, SOLUTION INTRA-ARTICULAR; INTRALESIONAL; INTRAMUSCULAR; INTRAVENOUS; SOFT TISSUE PRN
Status: DISCONTINUED | OUTPATIENT
Start: 2018-05-17 | End: 2018-05-17 | Stop reason: SDUPTHER

## 2018-05-17 RX ORDER — DIPHENHYDRAMINE HYDROCHLORIDE 50 MG/ML
12.5 INJECTION INTRAMUSCULAR; INTRAVENOUS
Status: DISCONTINUED | OUTPATIENT
Start: 2018-05-17 | End: 2018-05-17 | Stop reason: HOSPADM

## 2018-05-17 RX ORDER — ONDANSETRON 2 MG/ML
INJECTION INTRAMUSCULAR; INTRAVENOUS PRN
Status: DISCONTINUED | OUTPATIENT
Start: 2018-05-17 | End: 2018-05-17 | Stop reason: SDUPTHER

## 2018-05-17 RX ORDER — NEOSTIGMINE METHYLSULFATE 5 MG/5 ML
SYRINGE (ML) INTRAVENOUS PRN
Status: DISCONTINUED | OUTPATIENT
Start: 2018-05-17 | End: 2018-05-17 | Stop reason: SDUPTHER

## 2018-05-17 RX ORDER — BUPIVACAINE HYDROCHLORIDE 2.5 MG/ML
INJECTION, SOLUTION EPIDURAL; INFILTRATION; INTRACAUDAL PRN
Status: DISCONTINUED | OUTPATIENT
Start: 2018-05-17 | End: 2018-05-17 | Stop reason: HOSPADM

## 2018-05-17 RX ORDER — FENTANYL CITRATE 50 UG/ML
INJECTION, SOLUTION INTRAMUSCULAR; INTRAVENOUS PRN
Status: DISCONTINUED | OUTPATIENT
Start: 2018-05-17 | End: 2018-05-17 | Stop reason: SDUPTHER

## 2018-05-17 RX ORDER — SODIUM CHLORIDE, SODIUM LACTATE, POTASSIUM CHLORIDE, CALCIUM CHLORIDE 600; 310; 30; 20 MG/100ML; MG/100ML; MG/100ML; MG/100ML
INJECTION, SOLUTION INTRAVENOUS CONTINUOUS
Status: DISCONTINUED | OUTPATIENT
Start: 2018-05-17 | End: 2018-05-17 | Stop reason: HOSPADM

## 2018-05-17 RX ORDER — MIDAZOLAM HYDROCHLORIDE 1 MG/ML
INJECTION INTRAMUSCULAR; INTRAVENOUS PRN
Status: DISCONTINUED | OUTPATIENT
Start: 2018-05-17 | End: 2018-05-17 | Stop reason: SDUPTHER

## 2018-05-17 RX ORDER — FENTANYL CITRATE 50 UG/ML
25 INJECTION, SOLUTION INTRAMUSCULAR; INTRAVENOUS EVERY 5 MIN PRN
Status: DISCONTINUED | OUTPATIENT
Start: 2018-05-17 | End: 2018-05-17 | Stop reason: HOSPADM

## 2018-05-17 RX ORDER — FENTANYL CITRATE 50 UG/ML
25 INJECTION, SOLUTION INTRAMUSCULAR; INTRAVENOUS EVERY 5 MIN PRN
Status: COMPLETED | OUTPATIENT
Start: 2018-05-17 | End: 2018-05-17

## 2018-05-17 RX ORDER — GLYCOPYRROLATE 1 MG/5 ML
SYRINGE (ML) INTRAVENOUS PRN
Status: DISCONTINUED | OUTPATIENT
Start: 2018-05-17 | End: 2018-05-17 | Stop reason: SDUPTHER

## 2018-05-17 RX ORDER — SODIUM CHLORIDE 0.9 % (FLUSH) 0.9 %
10 SYRINGE (ML) INJECTION PRN
Status: DISCONTINUED | OUTPATIENT
Start: 2018-05-17 | End: 2018-05-17 | Stop reason: HOSPADM

## 2018-05-17 RX ORDER — ROCURONIUM BROMIDE 10 MG/ML
INJECTION, SOLUTION INTRAVENOUS PRN
Status: DISCONTINUED | OUTPATIENT
Start: 2018-05-17 | End: 2018-05-17 | Stop reason: SDUPTHER

## 2018-05-17 RX ORDER — LIDOCAINE HYDROCHLORIDE 10 MG/ML
1 INJECTION, SOLUTION EPIDURAL; INFILTRATION; INTRACAUDAL; PERINEURAL
Status: DISCONTINUED | OUTPATIENT
Start: 2018-05-17 | End: 2018-05-17 | Stop reason: HOSPADM

## 2018-05-17 RX ORDER — IBUPROFEN 600 MG/1
600 TABLET ORAL EVERY 6 HOURS PRN
Qty: 30 TABLET | Refills: 0 | Status: SHIPPED | OUTPATIENT
Start: 2018-05-17

## 2018-05-17 RX ORDER — MEPERIDINE HYDROCHLORIDE 50 MG/ML
12.5 INJECTION INTRAMUSCULAR; INTRAVENOUS; SUBCUTANEOUS EVERY 5 MIN PRN
Status: DISCONTINUED | OUTPATIENT
Start: 2018-05-17 | End: 2018-05-17 | Stop reason: HOSPADM

## 2018-05-17 RX ORDER — SODIUM CHLORIDE 0.9 % (FLUSH) 0.9 %
10 SYRINGE (ML) INJECTION EVERY 12 HOURS SCHEDULED
Status: DISCONTINUED | OUTPATIENT
Start: 2018-05-17 | End: 2018-05-17 | Stop reason: HOSPADM

## 2018-05-17 RX ORDER — HYDRALAZINE HYDROCHLORIDE 20 MG/ML
5 INJECTION INTRAMUSCULAR; INTRAVENOUS EVERY 10 MIN PRN
Status: DISCONTINUED | OUTPATIENT
Start: 2018-05-17 | End: 2018-05-17 | Stop reason: HOSPADM

## 2018-05-17 RX ORDER — LIDOCAINE HYDROCHLORIDE 20 MG/ML
INJECTION, SOLUTION INFILTRATION; PERINEURAL PRN
Status: DISCONTINUED | OUTPATIENT
Start: 2018-05-17 | End: 2018-05-17 | Stop reason: SDUPTHER

## 2018-05-17 RX ORDER — PROPOFOL 10 MG/ML
INJECTION, EMULSION INTRAVENOUS PRN
Status: DISCONTINUED | OUTPATIENT
Start: 2018-05-17 | End: 2018-05-17 | Stop reason: SDUPTHER

## 2018-05-17 RX ORDER — KETOROLAC TROMETHAMINE 30 MG/ML
INJECTION, SOLUTION INTRAMUSCULAR; INTRAVENOUS PRN
Status: DISCONTINUED | OUTPATIENT
Start: 2018-05-17 | End: 2018-05-17 | Stop reason: SDUPTHER

## 2018-05-17 RX ORDER — PROMETHAZINE HYDROCHLORIDE 25 MG/ML
6.25 INJECTION, SOLUTION INTRAMUSCULAR; INTRAVENOUS
Status: DISCONTINUED | OUTPATIENT
Start: 2018-05-17 | End: 2018-05-17 | Stop reason: HOSPADM

## 2018-05-17 RX ADMIN — FENTANYL CITRATE 50 MCG: 50 INJECTION, SOLUTION INTRAMUSCULAR; INTRAVENOUS at 08:45

## 2018-05-17 RX ADMIN — Medication 0.6 MG: at 09:14

## 2018-05-17 RX ADMIN — ONDANSETRON 4 MG: 2 INJECTION INTRAMUSCULAR; INTRAVENOUS at 08:37

## 2018-05-17 RX ADMIN — FENTANYL CITRATE 25 MCG: 50 INJECTION, SOLUTION INTRAMUSCULAR; INTRAVENOUS at 10:15

## 2018-05-17 RX ADMIN — PROPOFOL 150 MG: 10 INJECTION, EMULSION INTRAVENOUS at 08:17

## 2018-05-17 RX ADMIN — LIDOCAINE HYDROCHLORIDE 60 MG: 20 INJECTION, SOLUTION INFILTRATION; PERINEURAL at 08:17

## 2018-05-17 RX ADMIN — ROCURONIUM BROMIDE 10 MG: 10 INJECTION INTRAVENOUS at 08:47

## 2018-05-17 RX ADMIN — KETOROLAC TROMETHAMINE 30 MG: 30 INJECTION, SOLUTION INTRAMUSCULAR at 09:11

## 2018-05-17 RX ADMIN — FENTANYL CITRATE 50 MCG: 50 INJECTION, SOLUTION INTRAMUSCULAR; INTRAVENOUS at 08:24

## 2018-05-17 RX ADMIN — ROCURONIUM BROMIDE 30 MG: 10 INJECTION INTRAVENOUS at 08:17

## 2018-05-17 RX ADMIN — FENTANYL CITRATE 25 MCG: 50 INJECTION, SOLUTION INTRAMUSCULAR; INTRAVENOUS at 10:08

## 2018-05-17 RX ADMIN — FENTANYL CITRATE 25 MCG: 50 INJECTION, SOLUTION INTRAMUSCULAR; INTRAVENOUS at 10:00

## 2018-05-17 RX ADMIN — FENTANYL CITRATE 100 MCG: 50 INJECTION, SOLUTION INTRAMUSCULAR; INTRAVENOUS at 08:17

## 2018-05-17 RX ADMIN — MIDAZOLAM 2 MG: 1 INJECTION INTRAMUSCULAR; INTRAVENOUS at 08:13

## 2018-05-17 RX ADMIN — DEXAMETHASONE SODIUM PHOSPHATE 4 MG: 4 INJECTION, SOLUTION INTRAMUSCULAR; INTRAVENOUS at 08:37

## 2018-05-17 RX ADMIN — Medication 3 MG: at 09:14

## 2018-05-17 RX ADMIN — SODIUM CHLORIDE, POTASSIUM CHLORIDE, SODIUM LACTATE AND CALCIUM CHLORIDE: 600; 310; 30; 20 INJECTION, SOLUTION INTRAVENOUS at 08:55

## 2018-05-17 RX ADMIN — FENTANYL CITRATE 25 MCG: 50 INJECTION, SOLUTION INTRAMUSCULAR; INTRAVENOUS at 10:21

## 2018-05-17 RX ADMIN — SODIUM CHLORIDE, POTASSIUM CHLORIDE, SODIUM LACTATE AND CALCIUM CHLORIDE: 600; 310; 30; 20 INJECTION, SOLUTION INTRAVENOUS at 07:21

## 2018-05-17 ASSESSMENT — PULMONARY FUNCTION TESTS
PIF_VALUE: 26
PIF_VALUE: 17
PIF_VALUE: 17
PIF_VALUE: 16
PIF_VALUE: 18
PIF_VALUE: 7
PIF_VALUE: 17
PIF_VALUE: 18
PIF_VALUE: 27
PIF_VALUE: 0
PIF_VALUE: 18
PIF_VALUE: 27
PIF_VALUE: 16
PIF_VALUE: 19
PIF_VALUE: 18
PIF_VALUE: 16
PIF_VALUE: 17
PIF_VALUE: 27
PIF_VALUE: 27
PIF_VALUE: 17
PIF_VALUE: 17
PIF_VALUE: 21
PIF_VALUE: 2
PIF_VALUE: 27
PIF_VALUE: 27
PIF_VALUE: 20
PIF_VALUE: 17
PIF_VALUE: 18
PIF_VALUE: 27
PIF_VALUE: 0
PIF_VALUE: 0
PIF_VALUE: 25
PIF_VALUE: 27
PIF_VALUE: 27
PIF_VALUE: 20
PIF_VALUE: 18
PIF_VALUE: 20
PIF_VALUE: 18
PIF_VALUE: 2
PIF_VALUE: 27
PIF_VALUE: 16
PIF_VALUE: 17
PIF_VALUE: 16
PIF_VALUE: 20
PIF_VALUE: 15
PIF_VALUE: 19
PIF_VALUE: 16
PIF_VALUE: 27
PIF_VALUE: 27
PIF_VALUE: 17
PIF_VALUE: 17
PIF_VALUE: 16
PIF_VALUE: 2
PIF_VALUE: 25
PIF_VALUE: 18
PIF_VALUE: 17
PIF_VALUE: 2
PIF_VALUE: 17
PIF_VALUE: 17
PIF_VALUE: 5
PIF_VALUE: 18
PIF_VALUE: 17
PIF_VALUE: 27
PIF_VALUE: 18
PIF_VALUE: 18
PIF_VALUE: 27
PIF_VALUE: 20
PIF_VALUE: 27
PIF_VALUE: 20
PIF_VALUE: 18
PIF_VALUE: 5
PIF_VALUE: 19
PIF_VALUE: 18
PIF_VALUE: 18

## 2018-05-17 ASSESSMENT — PAIN - FUNCTIONAL ASSESSMENT: PAIN_FUNCTIONAL_ASSESSMENT: 0-10

## 2018-05-17 ASSESSMENT — PAIN SCALES - GENERAL
PAINLEVEL_OUTOF10: 5
PAINLEVEL_OUTOF10: 4
PAINLEVEL_OUTOF10: 0
PAINLEVEL_OUTOF10: 0
PAINLEVEL_OUTOF10: 3
PAINLEVEL_OUTOF10: 5

## 2018-05-18 ENCOUNTER — TELEPHONE (OUTPATIENT)
Dept: OBGYN CLINIC | Age: 38
End: 2018-05-18

## 2018-05-31 ENCOUNTER — OFFICE VISIT (OUTPATIENT)
Dept: OBGYN CLINIC | Age: 38
End: 2018-05-31

## 2018-05-31 VITALS
HEIGHT: 67 IN | BODY MASS INDEX: 22.76 KG/M2 | DIASTOLIC BLOOD PRESSURE: 80 MMHG | WEIGHT: 145 LBS | SYSTOLIC BLOOD PRESSURE: 120 MMHG | HEART RATE: 78 BPM

## 2018-05-31 DIAGNOSIS — N80.30 ENDOMETRIOSIS OF PERITONEUM: Primary | ICD-10-CM

## 2018-05-31 DIAGNOSIS — Z09 POSTOP CHECK: ICD-10-CM

## 2018-05-31 DIAGNOSIS — N80.9 ENDOMETRIOSIS DETERMINED BY LAPAROSCOPY: ICD-10-CM

## 2018-05-31 DIAGNOSIS — R10.2 PELVIC PAIN IN FEMALE: ICD-10-CM

## 2018-05-31 PROCEDURE — 99024 POSTOP FOLLOW-UP VISIT: CPT | Performed by: OBSTETRICS & GYNECOLOGY

## 2018-05-31 RX ORDER — PROMETHAZINE HYDROCHLORIDE 25 MG/1
25 TABLET ORAL EVERY 6 HOURS PRN
COMMUNITY
Start: 2018-05-29

## 2018-06-29 ENCOUNTER — OFFICE VISIT (OUTPATIENT)
Dept: OBGYN CLINIC | Age: 38
End: 2018-06-29

## 2018-06-29 VITALS
WEIGHT: 145 LBS | HEART RATE: 80 BPM | HEIGHT: 67 IN | BODY MASS INDEX: 22.76 KG/M2 | DIASTOLIC BLOOD PRESSURE: 68 MMHG | SYSTOLIC BLOOD PRESSURE: 114 MMHG

## 2018-06-29 DIAGNOSIS — Z30.09 FAMILY PLANNING: ICD-10-CM

## 2018-06-29 DIAGNOSIS — N80.30 ENDOMETRIOSIS OF PERITONEUM: Primary | ICD-10-CM

## 2018-06-29 DIAGNOSIS — R10.2 PELVIC PAIN IN FEMALE: ICD-10-CM

## 2018-06-29 DIAGNOSIS — N80.9 ENDOMETRIOSIS DETERMINED BY LAPAROSCOPY: ICD-10-CM

## 2018-06-29 DIAGNOSIS — N94.6 DYSMENORRHEA: ICD-10-CM

## 2018-06-29 PROCEDURE — G8427 DOCREV CUR MEDS BY ELIG CLIN: HCPCS | Performed by: OBSTETRICS & GYNECOLOGY

## 2018-06-29 PROCEDURE — 4004F PT TOBACCO SCREEN RCVD TLK: CPT | Performed by: OBSTETRICS & GYNECOLOGY

## 2018-06-29 PROCEDURE — 99024 POSTOP FOLLOW-UP VISIT: CPT | Performed by: OBSTETRICS & GYNECOLOGY

## 2018-06-29 PROCEDURE — G8420 CALC BMI NORM PARAMETERS: HCPCS | Performed by: OBSTETRICS & GYNECOLOGY

## 2018-07-13 ENCOUNTER — HOSPITAL ENCOUNTER (OUTPATIENT)
Age: 38
Discharge: HOME | End: 2018-07-13
Payer: COMMERCIAL

## 2018-07-13 ENCOUNTER — HOSPITAL ENCOUNTER (OUTPATIENT)
Dept: ULTRASOUND IMAGING | Age: 38
Discharge: HOME | End: 2018-07-15
Payer: COMMERCIAL

## 2018-07-13 DIAGNOSIS — R10.11 RUQ PAIN: ICD-10-CM

## 2018-07-13 LAB
ALBUMIN SERPL-MCNC: 3.9 G/DL (ref 3.5–5.2)
ALBUMIN/GLOBULIN RATIO: ABNORMAL (ref 1–2.5)
ALP BLD-CCNC: 110 U/L (ref 35–104)
ALT SERPL-CCNC: 9 U/L (ref 5–33)
AST SERPL-CCNC: 15 U/L
BILIRUB SERPL-MCNC: 0.63 MG/DL (ref 0.3–1.2)
BILIRUBIN DIRECT: 0.18 MG/DL
BILIRUBIN, INDIRECT: 0.45 MG/DL (ref 0–1)
GLOBULIN: ABNORMAL G/DL (ref 1.5–3.8)
TOTAL PROTEIN: 7.1 G/DL (ref 6.4–8.3)

## 2018-07-13 PROCEDURE — 76705 ECHO EXAM OF ABDOMEN: CPT

## 2018-07-13 PROCEDURE — 36415 COLL VENOUS BLD VENIPUNCTURE: CPT

## 2018-07-13 PROCEDURE — 80076 HEPATIC FUNCTION PANEL: CPT

## 2018-08-16 ENCOUNTER — OFFICE VISIT (OUTPATIENT)
Dept: OBGYN CLINIC | Age: 38
End: 2018-08-16
Payer: COMMERCIAL

## 2018-08-16 VITALS
HEIGHT: 67 IN | WEIGHT: 145 LBS | SYSTOLIC BLOOD PRESSURE: 114 MMHG | DIASTOLIC BLOOD PRESSURE: 70 MMHG | HEART RATE: 74 BPM | RESPIRATION RATE: 18 BRPM | BODY MASS INDEX: 22.76 KG/M2

## 2018-08-16 DIAGNOSIS — Z01.419 WELL FEMALE EXAM WITH ROUTINE GYNECOLOGICAL EXAM: Primary | ICD-10-CM

## 2018-08-16 PROCEDURE — 99395 PREV VISIT EST AGE 18-39: CPT | Performed by: ADVANCED PRACTICE MIDWIFE

## 2018-08-16 NOTE — PROGRESS NOTES
Refill    Prenatal Vit-Fe Fumarate-FA (ZACK-RUL PRENATAL VITAMINS) 28-0.8 MG TABS Take 1 tablet by mouth daily 30 tablet 5    promethazine (PHENERGAN) 25 MG tablet       ibuprofen (ADVIL;MOTRIN) 600 MG tablet Take 1 tablet by mouth every 6 hours as needed for Pain 30 tablet 0    ondansetron (ZOFRAN) 4 MG tablet Take 4 mg by mouth every 8 hours as needed for Nausea or Vomiting      DICLOFENAC PO Take 25 mg by mouth daily      tiZANidine (ZANAFLEX) 4 MG tablet Take 1 tablet by mouth every 12 hours as needed (for muscle spasm) 60 tablet 0    fluticasone (FLONASE) 50 MCG/ACT nasal spray 1 spray by Nasal route 2 times daily      loratadine (CLARITIN) 10 MG tablet Take 10 mg by mouth daily      meclizine (ANTIVERT) 12.5 MG tablet Take 12.5 mg by mouth 3 times daily as needed      butalbital-acetaminophen-caffeine (FIORICET, ESGIC) -40 MG per tablet Take 1 tablet by mouth every 4 hours as needed for Headaches       Current Facility-Administered Medications   Medication Dose Route Frequency Provider Last Rate Last Dose    leuprolide (LUPRON) injection 3.75 mg  3.75 mg Intramuscular Once Tien, [employer], APRN - CNP         Facility-Administered Medications Ordered in Other Visits   Medication Dose Route Frequency Provider Last Rate Last Dose    lactated ringers infusion   Intravenous Continuous Bi Garcia MD        sodium chloride flush 0.9 % injection 10 mL  10 mL Intravenous 2 times per day Hernan Guerra MD        sodium chloride flush 0.9 % injection 10 mL  10 mL Intravenous PRN Bi Garcia MD        ceFAZolin (ANCEF) 1 g IVPB 50mL minibag D5W  1 g Intravenous Once Dominique Fagan MD               ALLERGIES:  Allergies as of 08/16/2018 - Review Complete 08/16/2018   Allergen Reaction Noted    Vicodin [hydrocodone-acetaminophen] Shortness Of Breath 03/04/2015    Hydrocodone  05/01/2015    Tape [adhesive tape]  05/08/2018    Bactrim [sulfamethoxazole-trimethoprim] Nausea And Indigestion, Heartburn, Nausea, vomiting, Diarrhea, Constipation,or Bowel Changes; No Bloody Stools or melena  Genito-Urinary ROS: No Dysuria, Hematuria or Nocturia. No Urinary Incontinence or Vaginal Discharge  Musculoskeletal ROS: No Arthralgia, Arthritis,Gout,Osteoporosis or Rheumatism  Neurological ROS: No CVA, Migraines, Epilepsy, Seizure Hx, or Limb Weakness  Dermatological ROS: No Rash, Itching, Hives, Mole Changes or Cancer                                                                                                                                                                                                                                  PHYSICAL Exam:     Constitutional:  Vitals:    08/16/18 1445   BP: 114/70   Site: Right Arm   Position: Sitting   Cuff Size: Medium Adult   Pulse: 74   Resp: 18   Weight: 145 lb (65.8 kg)   Height: 5' 7\" (1.702 m)         General Appearance: This  is a well Developed, well Nourished, well groomed female. Her BMI was reviewed. Nutritional decision making was discussed. Skin:  There was a Normal Inspection of the skin without rashes or lesions. There were no rashes. (Papular, Maculopapular, Hives, Pustular, Macular)     There were no lesions (Ulcers, Erythema, Abn. Appearing Nevi)            Lymphatic:  No Lymph Nodes were Palpable in the neck , axilla or groin. # Of Lymph Nodes; Location ; Character [Normal]  [Shotty] [Tender] [Enlarged]     Neck and EENT:  The neck was supple. There were no masses   The thyroid was not enlarged and had no masses. Perrla, EOMI B/L, TMI B/L No Abnormalities. Throat inspected-No exudates or Masses, Nares Patent No Masses        Respiratory: The lungs were auscultated and found to be clear. There were no rales, rhonchi or wheezes. There was a good respiratory effort. Cardiovascular: The heart was in a regular rate and rhythm. . No S3 or S4. There was no murmur appreciated.  Location, grade, and radiation are not Endometriosis determined by laparoscopy Stage 2 and successful chromopertubation 5/17/2018 05/31/2018     Priority: High    Endometriosis of peritoneum 9/2015 L-Scope Stage 3-4 10/31/2017     Priority: Medium    5/17/18 Operative Laparoscopy w/ Fulgeration of Endometriosis (Stage 2), STERLING, and Chromopertibation 05/17/2018    Acute back pain with sciatica, left     Chronic knee pain 08/16/2017    Chronic right shoulder pain 03/09/2017    Amenorrhea 02/03/2017    Breast discharge 02/03/2017    Olecranon bursitis of right elbow 09/20/2016    Sebaceous cyst 08/26/2016    Ganglion cyst 08/18/2016    Wrist pain, chronic 08/12/2016    Chronic bilateral low back pain with bilateral sciatica 08/12/2016    Arthralgia of both elbows 06/13/2016    Arthritis 06/13/2016    Pelvic pain in female 05/19/2015          Hereditary Breast, Ovarian, Colon and Uterine Cancer screening Done. Tobacco & Secondary smoke risks reviewed; instructed on cessation and avoidance      Counseling Completed:  Preventative Health Recommendations and Follow up. The patient was informed of the recommended preventative health recommendations. 1. Annuals every year; Cytology collections per prevailing guidelines. 2. Mammograms begin every year at 37 yo if no abnormalities are found and no family     History. 3. Bone density studies every 2-3 years. Begin at 71 yo. If no fracture history or osteoporosis family history. (significant). 4. Colonoscopy begin at 40 yo. Repeat every ten years if negative and no family history. 5. Calcium of 0369-0642 mg/day in split dosing  6. Vitamin D 400-800 IU/day  7. All other preventative health recommendations will be managed by the patients Primary care physician. PLAN:  Return for Annual.  Repeat Annual every 1 year  Cervical Cytology Evaluation begins at 24years old. If Negative Cytology, Follow-up screening per current guidelines. Mammograms every 1 year.  If 37 yo and last mammogram was negative. Calcium and Vitamin D dosing reviewed. Colonoscopy screening reviewed as well as onset for bone density testing. Birth control and barrier recommendations discussed. STD counseling and prevention reviewed. Gardisil counseling completed for all patients 7-35 yo. Routine health maintenance per patients PCP. No orders of the defined types were placed in this encounter.

## 2018-08-28 ENCOUNTER — OFFICE VISIT (OUTPATIENT)
Dept: OBGYN CLINIC | Age: 38
End: 2018-08-28
Payer: COMMERCIAL

## 2018-08-28 VITALS
WEIGHT: 145 LBS | BODY MASS INDEX: 22.76 KG/M2 | HEIGHT: 67 IN | DIASTOLIC BLOOD PRESSURE: 84 MMHG | SYSTOLIC BLOOD PRESSURE: 112 MMHG

## 2018-08-28 DIAGNOSIS — Z30.09 FAMILY PLANNING: Primary | ICD-10-CM

## 2018-08-28 DIAGNOSIS — N80.9 ENDOMETRIOSIS DETERMINED BY LAPAROSCOPY: ICD-10-CM

## 2018-08-28 DIAGNOSIS — O09.529 ANTEPARTUM MULTIGRAVIDA OF ADVANCED MATERNAL AGE: ICD-10-CM

## 2018-08-28 PROCEDURE — G8420 CALC BMI NORM PARAMETERS: HCPCS | Performed by: OBSTETRICS & GYNECOLOGY

## 2018-08-28 PROCEDURE — 4004F PT TOBACCO SCREEN RCVD TLK: CPT | Performed by: OBSTETRICS & GYNECOLOGY

## 2018-08-28 PROCEDURE — 99213 OFFICE O/P EST LOW 20 MIN: CPT | Performed by: OBSTETRICS & GYNECOLOGY

## 2018-08-28 PROCEDURE — G8427 DOCREV CUR MEDS BY ELIG CLIN: HCPCS | Performed by: OBSTETRICS & GYNECOLOGY

## 2018-08-28 RX ORDER — FOLIC ACID 1 MG/1
1 TABLET ORAL EVERY 6 HOURS
Qty: 120 TABLET | Refills: 5 | Status: SHIPPED | OUTPATIENT
Start: 2018-08-28 | End: 2019-02-21 | Stop reason: SDUPTHER

## 2018-08-28 NOTE — PROGRESS NOTES
Glen Ibarra  2018      Glen Ibarra is a 41 y.o. female       The patient was seen today. She was here to follow-up regarding her labs and diagnostics ordered at her last visit for the diagnosis of:    ICD-10-CM ICD-9-CM    1. Family planning X46.15 C54.73 folic acid (FOLVITE) 1 MG tablet   2. Endometriosis determined by laparoscopy Stage 2 and successful chromopertubation 2018 N80.9 617.9    3. Antepartum multigravida of advanced maternal age O12.46 65.56        She does not have any specific chief complaint today. Her bowels are regular and she is voiding without difficulty.  NO NTD or AWD on either side of parents      Past Medical History:   Diagnosis Date    Anxiety     Asthma     no meds, no inhalers    Chronic back pain     Cough     dry    Depression     Endometriosis     stage 4    Headache     Lazy eye     bilateral    Migraines     Osteoarthritis     tennis elbow and bursitis    Ovarian cyst     Seasonal allergies     laundry detergent, bar soap          Past Surgical History:   Procedure Laterality Date   [facility]    had tailbone surgery -removed several cysts from\"[place]    CYST REMOVAL  1996    tailbone    DILATION AND CURETTAGE OF UTERUS      LAPAROSCOPY  9/24/15    operative,hysteroscopy, D&C    DC EXCISION TUMOR SOFT TISSUE BACK/FLANK SUBQ <3CM N/A 2017    UPPER BACK AND RIGHT GROIN SEBACEOUS CYST EXCISION performed by Morenita Holley DO at [address] [facility] N/A 2018    OPERATIVE LAPAROSCOPY W/FULGURATION OF ENDOMETRIOSIS STAGE 2  W/CHROMOPERTIBATION performed by Ilana Pineda DO at [address]           Family History   Problem Relation Age of Onset    Cancer Maternal Grandmother     Lung Cancer Maternal Grandmother     Diabetes Maternal Grandmother     Breast Cancer Maternal Grandmother     Arthritis Maternal Grandmother     Heart Disease Maternal Grandmother     mouth daily      meclizine (ANTIVERT) 12.5 MG tablet Take 12.5 mg by mouth 3 times daily as needed      butalbital-acetaminophen-caffeine (FIORICET, ESGIC) -40 MG per tablet Take 1 tablet by mouth every 4 hours as needed for Headaches       Current Facility-Administered Medications   Medication Dose Route Frequency Provider Last Rate Last Dose    leuprolide (LUPRON) injection 3.75 mg  3.75 mg Intramuscular Once [employer], APRN - CNP         Facility-Administered Medications Ordered in Other Visits   Medication Dose Route Frequency Provider Last Rate Last Dose    lactated ringers infusion   Intravenous Continuous Bi Salmeron MD        sodium chloride flush 0.9 % injection 10 mL  10 mL Intravenous 2 times per day Aby Mahmood MD        sodium chloride flush 0.9 % injection 10 mL  10 mL Intravenous PRN Bi Salmeron MD        ceFAZolin (ANCEF) 1 g IVPB 50mL minibag D5W  1 g Intravenous Once Christian Mello MD             ALLERGIES:  Allergies as of 08/28/2018 - Review Complete 08/28/2018   Allergen Reaction Noted    Vicodin [hydrocodone-acetaminophen] Shortness Of Breath 03/04/2015    Hydrocodone  05/01/2015    Tape [adhesive tape]  05/08/2018    Bactrim [sulfamethoxazole-trimethoprim] Nausea And Vomiting 06/13/2016         Blood pressure 112/84, height 5' 7\" (1.702 m), weight 145 lb (65.8 kg), last menstrual period 08/19/2018, not currently breastfeeding. Abdomen: Soft non-tender; good bowel sounds. No guarding, rebound or rigidity. No CVA tenderness bilaterally. Extremities: No calf tenderness, DTR 2/4, and No edema bilaterally    Pelvic: Declined         Diagnostics:  No results found.       Lab Results:  Results for orders placed or performed during the hospital encounter of 07/13/18   Hepatic Function Panel   Result Value Ref Range    Alb 3.9 3.5 - 5.2 g/dL    Alkaline Phosphatase 110 (H) 35 - 104 U/L    ALT 9 5 - 33 U/L    AST 15 <32 U/L    Total Bilirubin 0.63 0.3 - () for delivery method   Gestational diabetes - diabetes that develops for the first time during pregnancy. Women who have this could possibly have a very large baby who then is at risk for injuries during delivery. Pregnancy Induced Hypertension - High blood pressure   Placental Problems - one of the most common placental problems is placenta previa in which the placenta covers all or part of the uterine opening (cervix). This can cause severe bleeding during delivery and can make  delivery necessary. Even with all of these increased risks, with the advancement in medical procedures and testing, there are several ways to reduce your risk. They include early and regular prenatal care, taking the multivitamin with folic acid prescribed by your health care manager, beginning pregnancy at a healthy weight, not smoking or drinking alcoholic beverages, and eating healthy foods. There are tests that all pregnant women are encouraged to take, but there is additional prenatal testing that is regularly offered to any woman 28 or older because of the potential of increased risks to the mother and baby. These tests are chorionic villus sampling (CVS) and amniocentesis. [facility] [employer] is also available which is a non-invasive option for fetal karyotyping. With CVS, a small sample of cells (called chorionic villi) is taken from the placenta where it attached to the wall of the uterus. Chorionic villi are tiny parts of the placenta; therefore they have the same genes as the baby. This test is 98% accurate, but can carry a slightly higher risk of miscarriage than amniocentesis, since the procedure is done in early pregnancy. Amniocentesis is a procedure where a sample of fluid is removed from the amniotic sac for analysis and evaluation. During this procedure, fluid is removed by placing a long needle through the abdominal wall into the amniotic sac.  The amniocentesis needle is typically guided into the sac with

## 2018-09-12 ENCOUNTER — OFFICE VISIT (OUTPATIENT)
Dept: OBGYN CLINIC | Age: 38
End: 2018-09-12
Payer: COMMERCIAL

## 2018-09-12 VITALS
HEIGHT: 67 IN | DIASTOLIC BLOOD PRESSURE: 82 MMHG | BODY MASS INDEX: 23.23 KG/M2 | HEART RATE: 72 BPM | RESPIRATION RATE: 18 BRPM | SYSTOLIC BLOOD PRESSURE: 116 MMHG | WEIGHT: 148 LBS

## 2018-09-12 DIAGNOSIS — Z51.89 ENCOUNTER FOR WOUND RE-CHECK: ICD-10-CM

## 2018-09-12 DIAGNOSIS — N80.9 ENDOMETRIOSIS DETERMINED BY LAPAROSCOPY: Primary | ICD-10-CM

## 2018-09-12 PROCEDURE — 10160 PNXR ASPIR ABSC HMTMA BULLA: CPT | Performed by: OBSTETRICS & GYNECOLOGY

## 2018-09-15 ENCOUNTER — HOSPITAL ENCOUNTER (EMERGENCY)
Age: 38
Discharge: HOME | End: 2018-09-15
Attending: EMERGENCY MEDICINE
Payer: COMMERCIAL

## 2018-09-15 VITALS
RESPIRATION RATE: 16 BRPM | OXYGEN SATURATION: 97 % | SYSTOLIC BLOOD PRESSURE: 153 MMHG | DIASTOLIC BLOOD PRESSURE: 106 MMHG | HEART RATE: 80 BPM | TEMPERATURE: 98.9 F | WEIGHT: 145 LBS | BODY MASS INDEX: 22.76 KG/M2 | HEIGHT: 67 IN

## 2018-09-15 DIAGNOSIS — J45.909 ACUTE ASTHMATIC BRONCHITIS: ICD-10-CM

## 2018-09-15 DIAGNOSIS — J06.9 ACUTE UPPER RESPIRATORY INFECTION: Primary | ICD-10-CM

## 2018-09-15 PROCEDURE — 99282 EMERGENCY DEPT VISIT SF MDM: CPT

## 2018-09-15 RX ORDER — ALBUTEROL SULFATE 90 UG/1
2 AEROSOL, METERED RESPIRATORY (INHALATION) EVERY 6 HOURS PRN
Qty: 1 INHALER | Refills: 0 | Status: SHIPPED | OUTPATIENT
Start: 2018-09-15

## 2018-09-15 RX ORDER — PREDNISONE 50 MG/1
50 TABLET ORAL DAILY
Qty: 5 TABLET | Refills: 0 | Status: SHIPPED | OUTPATIENT
Start: 2018-09-15 | End: 2018-09-20

## 2018-09-15 RX ORDER — GUAIFENESIN 600 MG/1
600 TABLET, EXTENDED RELEASE ORAL 2 TIMES DAILY
Qty: 10 TABLET | Refills: 0 | Status: SHIPPED | OUTPATIENT
Start: 2018-09-15 | End: 2018-09-20

## 2018-09-15 RX ORDER — BENZONATATE 100 MG/1
100 CAPSULE ORAL 3 TIMES DAILY PRN
Qty: 15 CAPSULE | Refills: 0 | Status: SHIPPED | OUTPATIENT
Start: 2018-09-15

## 2018-09-15 RX ORDER — AZITHROMYCIN 250 MG/1
TABLET, FILM COATED ORAL
Qty: 1 PACKET | Refills: 0 | Status: ON HOLD | OUTPATIENT
Start: 2018-09-15 | End: 2019-04-09 | Stop reason: ALTCHOICE

## 2018-09-15 ASSESSMENT — ENCOUNTER SYMPTOMS
SORE THROAT: 1
NAUSEA: 0
SHORTNESS OF BREATH: 0
VOMITING: 0
COUGH: 1
RHINORRHEA: 1
TROUBLE SWALLOWING: 0

## 2018-09-16 NOTE — ED PROVIDER NOTES
[address] ED  eMERGENCY dEPARTMENT eNCOUnter      Pt Name: Wanda Arroyo  MRN: [id number]  [place] 1980  Date of evaluation: 9/15/2018  Provider: YESSENIA Ohara CNP    CHIEF COMPLAINT       Chief Complaint   Patient presents with    Pharyngitis    Otalgia         HISTORY OF PRESENT ILLNESS  (Location/Symptom, Timing/Onset, Context/Setting, Quality, Duration, Modifying Factors, Severity.)   Wanda Arroyo is a 41 y.o. female who presents to the emergency department Complaints of sore throat, earache, cough and congestion. Patient states she had a fever of 101 home but it resolved when she got to emergency department. Cough is constant, productive of greenish sputum and worse at night or when laying down. Patient has been sick for one week and feels like symptoms are getting worse. She reports that she has swollen glands in her neck. Reports that she tried over-the-counter NyQuil, DayQuil and Flonase with no improvement of her symptoms. She smokes tobacco daily but has not been able to smoke since she got sick. History of asthma but not on a new medication or inhalers. Nursing Notes were reviewed and I agree. REVIEW OF SYSTEMS    (2-9 systems for level 4, 10 or more for level 5)     Review of Systems   Constitutional: Negative for chills and fever. HENT: Positive for congestion, ear pain (both, right ear worse), rhinorrhea and sore throat. Negative for trouble swallowing. Respiratory: Positive for cough. Negative for shortness of breath. Cardiovascular: Negative for chest pain and palpitations. Gastrointestinal: Negative for nausea and vomiting. Except as noted above the remainder of the review of systems was reviewed and negative.        PAST MEDICAL HISTORY         Diagnosis Date    Anxiety     Asthma     no meds, no inhalers    Chronic back pain     Cough     dry    Depression     Endometriosis     stage 4    Headache     Lazy eye     bilateral  Migraines     Osteoarthritis     tennis elbow and bursitis    Ovarian cyst     Seasonal allergies     laundry detergent, bar soap      Reviewed. SURGICAL HISTORY           Procedure Laterality Date   [facility]    had tailbone surgery -removed several cysts from\"[place]    CYST REMOVAL  1996    tailbone    DILATION AND CURETTAGE OF UTERUS      LAPAROSCOPY  9/24/15    operative,hysteroscopy, D&C    LAPAROSCOPY      DE EXCISION TUMOR SOFT TISSUE BACK/FLANK SUBQ <3CM N/A 12/19/2017    UPPER BACK AND RIGHT GROIN SEBACEOUS CYST EXCISION performed by Caitlin Aguilera DO at [facility] N/A 5/17/2018    OPERATIVE LAPAROSCOPY W/FULGURATION OF ENDOMETRIOSIS STAGE 2  W/CHROMOPERTIBATION performed by Lady Chandra DO at [address]       Reviewed. CURRENT MEDICATIONS       Previous Medications    BUTALBITAL-ACETAMINOPHEN-CAFFEINE (FIORICET, ESGIC) -40 MG PER TABLET    Take 1 tablet by mouth every 4 hours as needed for Headaches    DICLOFENAC PO    Take 25 mg by mouth daily    FLUTICASONE (FLONASE) 50 MCG/ACT NASAL SPRAY    1 spray by Nasal route 2 times daily    FOLIC ACID (FOLVITE) 1 MG TABLET    Take 1 tablet by mouth every 6 hours    IBUPROFEN (ADVIL;MOTRIN) 600 MG TABLET    Take 1 tablet by mouth every 6 hours as needed for Pain    LORATADINE (CLARITIN) 10 MG TABLET    Take 10 mg by mouth daily    MECLIZINE (ANTIVERT) 12.5 MG TABLET    Take 12.5 mg by mouth 3 times daily as needed    ONDANSETRON (ZOFRAN) 4 MG TABLET    Take 4 mg by mouth every 8 hours as needed for Nausea or Vomiting    PRENATAL VIT-FE FUMARATE-FA (ZACK-RUL PRENATAL VITAMINS) 28-0.8 MG TABS    Take 1 tablet by mouth daily    PROMETHAZINE (PHENERGAN) 25 MG TABLET        TIZANIDINE (ZANAFLEX) 4 MG TABLET    Take 1 tablet by mouth every 12 hours as needed (for muscle spasm)       ALLERGIES     Vicodin [hydrocodone-acetaminophen];  Hydrocodone; Tape [adhesive tape]; and Bactrim Constitutional: She is oriented to person, place, and time. She appears well-developed and well-nourished. HENT:   Head: Normocephalic and atraumatic. Right Ear: Tympanic membrane, external ear and ear canal normal.   Left Ear: Tympanic membrane, external ear and ear canal normal.   Nose: Nose normal.   Mouth/Throat: Uvula is midline, oropharynx is clear and moist and mucous membranes are normal.   Eyes: Right eye exhibits no discharge. Left eye exhibits no discharge. No scleral icterus. Neck: Normal range of motion. Cardiovascular: Normal rate, regular rhythm and normal heart sounds. Pulmonary/Chest: Effort normal. No stridor. No respiratory distress. She has wheezes (faint scattered wheezes at bases). Coughing on exam   Musculoskeletal: Normal range of motion. She exhibits no edema. Lymphadenopathy:     She has no cervical adenopathy. Neurological: She is alert and oriented to person, place, and time. Coordination normal.   Skin: Skin is warm and dry. She is not diaphoretic. Psychiatric: She has a normal mood and affect. Her behavior is normal.       DIAGNOSTIC RESULTS     RADIOLOGY:   none    LABS:  Labs Reviewed - No data to display    All other labs were within normal range or not returned as of this dictation. EMERGENCY DEPARTMENT COURSE and DIFFERENTIAL DIAGNOSIS/MDM:   Patient presents to ED with complaints of Right symptoms for one week. Reports fever. Afebrile here. Tried OTC medications with no improvement. History of asthma, history of tobacco.  Will treat with Zithromax, prednisone and albuterol inhaler. Cough medication and Mucinex as well. Patient was advised to quit smoking. Okay to discharge home. Follow-up with family doctor or clinic of choice in 1 or 2 days for a recheck. Return to ED if any worsening or new symptoms.       Vitals:    Vitals:    09/15/18 2011   BP: (!) 150/99   Pulse: 83   Resp: 16   Temp: 98.9 °F (37.2 °C)   TempSrc: Oral   SpO2: 98%   Weight: 145 lb (65.8 kg)   Height: 5' 7\" (1.702 m)       Vitals reviewed. PROCEDURES:  None    FINAL IMPRESSION      1. Acute upper respiratory infection    2. Acute asthmatic bronchitis          DISPOSITION/PLAN   DISPOSITION Decision To Discharge 09/15/2018 08:21:41 PM      PATIENT REFERRED TO:  YESSENIA Holt - CNP  [address] [facility],[address] Floor  [phone]    Schedule an appointment as soon as possible for a visit   Follow up visit    [facility] ED  [facility] [address] [phone]    If symptoms worsen      DISCHARGE MEDICATIONS:  New Prescriptions    ALBUTEROL SULFATE HFA (PROVENTIL HFA) 108 (90 BASE) MCG/ACT INHALER    Inhale 2 puffs into the lungs every 6 hours as needed for Wheezing    AZITHROMYCIN (ZITHROMAX) 250 MG TABLET    Take 2 tablets (500 mg) on Day 1, followed by 1 tablet (250 mg) once daily on Days 2 through 5.     BENZONATATE (TESSALON PERLES) 100 MG CAPSULE    Take 1 capsule by mouth 3 times daily as needed for Cough    GUAIFENESIN (MUCINEX) 600 MG EXTENDED RELEASE TABLET    Take 1 tablet by mouth 2 times daily for 5 days    PREDNISONE (DELTASONE) 50 MG TABLET    Take 1 tablet by mouth daily for 5 days       (Please note that portions of this note were completed with a voice recognition program.  Efforts were made to edit the dictations but occasionally words are mis-transcribed.)    YESSENIA Cummings CNP, APRN - CNP  09/15/18 2549

## 2018-12-21 DIAGNOSIS — Z30.09 FAMILY PLANNING: ICD-10-CM

## 2018-12-24 RX ORDER — ACETAMINOPHEN, DEXTROMETHORPHAN HBR, DOXYLAMINE SUCCINATE 650; 30; 12.5 MG/30ML; MG/30ML; MG/30ML
SOLUTION ORAL
Qty: 30 TABLET | Refills: 5 | Status: SHIPPED | OUTPATIENT
Start: 2018-12-24 | End: 2019-06-12 | Stop reason: SDUPTHER

## 2019-02-21 DIAGNOSIS — Z30.09 FAMILY PLANNING: ICD-10-CM

## 2019-02-21 RX ORDER — FOLIC ACID 1 MG/1
1 TABLET ORAL EVERY 6 HOURS
Qty: 120 TABLET | Refills: 5 | Status: SHIPPED | OUTPATIENT
Start: 2019-02-21 | End: 2019-10-23 | Stop reason: SDUPTHER

## 2019-04-09 ENCOUNTER — HOSPITAL ENCOUNTER (OUTPATIENT)
Age: 39
Setting detail: OUTPATIENT SURGERY
Discharge: HOME | End: 2019-04-09
Attending: SURGERY | Admitting: SURGERY
Payer: COMMERCIAL

## 2019-04-09 ENCOUNTER — ANESTHESIA (OUTPATIENT)
Dept: OPERATING ROOM | Age: 39
End: 2019-04-09
Payer: COMMERCIAL

## 2019-04-09 ENCOUNTER — ANESTHESIA EVENT (OUTPATIENT)
Dept: OPERATING ROOM | Age: 39
End: 2019-04-09
Payer: COMMERCIAL

## 2019-04-09 VITALS
SYSTOLIC BLOOD PRESSURE: 140 MMHG | HEIGHT: 67 IN | RESPIRATION RATE: 16 BRPM | OXYGEN SATURATION: 95 % | TEMPERATURE: 97.8 F | DIASTOLIC BLOOD PRESSURE: 76 MMHG | WEIGHT: 148 LBS | HEART RATE: 69 BPM | BODY MASS INDEX: 23.23 KG/M2

## 2019-04-09 VITALS — OXYGEN SATURATION: 98 % | SYSTOLIC BLOOD PRESSURE: 113 MMHG | DIASTOLIC BLOOD PRESSURE: 60 MMHG | TEMPERATURE: 96.8 F

## 2019-04-09 DIAGNOSIS — R22.31 MASS OF SKIN OF RIGHT HAND: Primary | ICD-10-CM

## 2019-04-09 LAB
-: NORMAL
HCG, PREGNANCY URINE (POC): NEGATIVE

## 2019-04-09 PROCEDURE — 3600000012 HC SURGERY LEVEL 2 ADDTL 15MIN: Performed by: SURGERY

## 2019-04-09 PROCEDURE — 88304 TISSUE EXAM BY PATHOLOGIST: CPT

## 2019-04-09 PROCEDURE — 2500000003 HC RX 250 WO HCPCS: Performed by: SURGERY

## 2019-04-09 PROCEDURE — 2580000003 HC RX 258: Performed by: ANESTHESIOLOGY

## 2019-04-09 PROCEDURE — 2580000003 HC RX 258: Performed by: NURSE ANESTHETIST, CERTIFIED REGISTERED

## 2019-04-09 PROCEDURE — 84703 CHORIONIC GONADOTROPIN ASSAY: CPT

## 2019-04-09 PROCEDURE — 2709999900 HC NON-CHARGEABLE SUPPLY: Performed by: SURGERY

## 2019-04-09 PROCEDURE — 7100000030 HC ASPR PHASE II RECOVERY - FIRST 15 MIN: Performed by: SURGERY

## 2019-04-09 PROCEDURE — 7100000031 HC ASPR PHASE II RECOVERY - ADDTL 15 MIN: Performed by: SURGERY

## 2019-04-09 PROCEDURE — 3700000001 HC ADD 15 MINUTES (ANESTHESIA): Performed by: SURGERY

## 2019-04-09 PROCEDURE — 3600000002 HC SURGERY LEVEL 2 BASE: Performed by: SURGERY

## 2019-04-09 PROCEDURE — 6360000002 HC RX W HCPCS: Performed by: NURSE ANESTHETIST, CERTIFIED REGISTERED

## 2019-04-09 PROCEDURE — 7100000001 HC PACU RECOVERY - ADDTL 15 MIN: Performed by: SURGERY

## 2019-04-09 PROCEDURE — 6360000002 HC RX W HCPCS: Performed by: SURGERY

## 2019-04-09 PROCEDURE — 7100000000 HC PACU RECOVERY - FIRST 15 MIN: Performed by: SURGERY

## 2019-04-09 PROCEDURE — 3700000000 HC ANESTHESIA ATTENDED CARE: Performed by: SURGERY

## 2019-04-09 PROCEDURE — 2500000003 HC RX 250 WO HCPCS: Performed by: NURSE ANESTHETIST, CERTIFIED REGISTERED

## 2019-04-09 RX ORDER — DIPHENHYDRAMINE HYDROCHLORIDE 50 MG/ML
12.5 INJECTION INTRAMUSCULAR; INTRAVENOUS
Status: DISCONTINUED | OUTPATIENT
Start: 2019-04-09 | End: 2019-04-09 | Stop reason: HOSPADM

## 2019-04-09 RX ORDER — MORPHINE SULFATE 2 MG/ML
2 INJECTION, SOLUTION INTRAMUSCULAR; INTRAVENOUS EVERY 5 MIN PRN
Status: DISCONTINUED | OUTPATIENT
Start: 2019-04-09 | End: 2019-04-09 | Stop reason: HOSPADM

## 2019-04-09 RX ORDER — PROMETHAZINE HYDROCHLORIDE 25 MG/ML
6.25 INJECTION, SOLUTION INTRAMUSCULAR; INTRAVENOUS
Status: DISCONTINUED | OUTPATIENT
Start: 2019-04-09 | End: 2019-04-09

## 2019-04-09 RX ORDER — PROPOFOL 10 MG/ML
INJECTION, EMULSION INTRAVENOUS CONTINUOUS PRN
Status: DISCONTINUED | OUTPATIENT
Start: 2019-04-09 | End: 2019-04-09 | Stop reason: SDUPTHER

## 2019-04-09 RX ORDER — OXYCODONE HYDROCHLORIDE AND ACETAMINOPHEN 5; 325 MG/1; MG/1
1 TABLET ORAL EVERY 6 HOURS PRN
Qty: 20 TABLET | Refills: 0 | Status: SHIPPED | OUTPATIENT
Start: 2019-04-09 | End: 2019-04-14

## 2019-04-09 RX ORDER — FENTANYL CITRATE 50 UG/ML
25 INJECTION, SOLUTION INTRAMUSCULAR; INTRAVENOUS EVERY 5 MIN PRN
Status: DISCONTINUED | OUTPATIENT
Start: 2019-04-09 | End: 2019-04-09 | Stop reason: HOSPADM

## 2019-04-09 RX ORDER — BUPIVACAINE HYDROCHLORIDE AND EPINEPHRINE 5; 5 MG/ML; UG/ML
INJECTION, SOLUTION EPIDURAL; INTRACAUDAL; PERINEURAL PRN
Status: DISCONTINUED | OUTPATIENT
Start: 2019-04-09 | End: 2019-04-09 | Stop reason: ALTCHOICE

## 2019-04-09 RX ORDER — SODIUM CHLORIDE, SODIUM LACTATE, POTASSIUM CHLORIDE, CALCIUM CHLORIDE 600; 310; 30; 20 MG/100ML; MG/100ML; MG/100ML; MG/100ML
INJECTION, SOLUTION INTRAVENOUS CONTINUOUS PRN
Status: DISCONTINUED | OUTPATIENT
Start: 2019-04-09 | End: 2019-04-09 | Stop reason: SDUPTHER

## 2019-04-09 RX ORDER — FENTANYL CITRATE 50 UG/ML
INJECTION, SOLUTION INTRAMUSCULAR; INTRAVENOUS PRN
Status: DISCONTINUED | OUTPATIENT
Start: 2019-04-09 | End: 2019-04-09 | Stop reason: SDUPTHER

## 2019-04-09 RX ORDER — LIDOCAINE HYDROCHLORIDE 10 MG/ML
INJECTION, SOLUTION EPIDURAL; INFILTRATION; INTRACAUDAL; PERINEURAL PRN
Status: DISCONTINUED | OUTPATIENT
Start: 2019-04-09 | End: 2019-04-09 | Stop reason: SDUPTHER

## 2019-04-09 RX ORDER — HYDRALAZINE HYDROCHLORIDE 20 MG/ML
5 INJECTION INTRAMUSCULAR; INTRAVENOUS EVERY 10 MIN PRN
Status: DISCONTINUED | OUTPATIENT
Start: 2019-04-09 | End: 2019-04-09 | Stop reason: HOSPADM

## 2019-04-09 RX ORDER — MEPERIDINE HYDROCHLORIDE 50 MG/ML
12.5 INJECTION INTRAMUSCULAR; INTRAVENOUS; SUBCUTANEOUS EVERY 5 MIN PRN
Status: DISCONTINUED | OUTPATIENT
Start: 2019-04-09 | End: 2019-04-09 | Stop reason: HOSPADM

## 2019-04-09 RX ORDER — MIDAZOLAM HYDROCHLORIDE 1 MG/ML
INJECTION INTRAMUSCULAR; INTRAVENOUS PRN
Status: DISCONTINUED | OUTPATIENT
Start: 2019-04-09 | End: 2019-04-09 | Stop reason: SDUPTHER

## 2019-04-09 RX ORDER — SODIUM CHLORIDE, SODIUM LACTATE, POTASSIUM CHLORIDE, CALCIUM CHLORIDE 600; 310; 30; 20 MG/100ML; MG/100ML; MG/100ML; MG/100ML
INJECTION, SOLUTION INTRAVENOUS CONTINUOUS
Status: DISCONTINUED | OUTPATIENT
Start: 2019-04-09 | End: 2019-04-09 | Stop reason: HOSPADM

## 2019-04-09 RX ORDER — 0.9 % SODIUM CHLORIDE 0.9 %
500 INTRAVENOUS SOLUTION INTRAVENOUS
Status: DISCONTINUED | OUTPATIENT
Start: 2019-04-09 | End: 2019-04-09 | Stop reason: HOSPADM

## 2019-04-09 RX ADMIN — FENTANYL CITRATE 25 MCG: 50 INJECTION, SOLUTION INTRAMUSCULAR; INTRAVENOUS at 07:43

## 2019-04-09 RX ADMIN — LIDOCAINE HYDROCHLORIDE 50 MG: 10 INJECTION, SOLUTION EPIDURAL; INFILTRATION; INTRACAUDAL; PERINEURAL at 07:36

## 2019-04-09 RX ADMIN — Medication 2 G: at 07:39

## 2019-04-09 RX ADMIN — SODIUM CHLORIDE, POTASSIUM CHLORIDE, SODIUM LACTATE AND CALCIUM CHLORIDE: 600; 310; 30; 20 INJECTION, SOLUTION INTRAVENOUS at 06:53

## 2019-04-09 RX ADMIN — MIDAZOLAM 2 MG: 1 INJECTION INTRAMUSCULAR; INTRAVENOUS at 07:31

## 2019-04-09 RX ADMIN — SODIUM CHLORIDE, POTASSIUM CHLORIDE, SODIUM LACTATE AND CALCIUM CHLORIDE: 600; 310; 30; 20 INJECTION, SOLUTION INTRAVENOUS at 07:31

## 2019-04-09 RX ADMIN — PROPOFOL 100 MCG/KG/MIN: 10 INJECTION, EMULSION INTRAVENOUS at 07:36

## 2019-04-09 RX ADMIN — FENTANYL CITRATE 75 MCG: 50 INJECTION, SOLUTION INTRAMUSCULAR; INTRAVENOUS at 07:48

## 2019-04-09 ASSESSMENT — PULMONARY FUNCTION TESTS
PIF_VALUE: 1
PIF_VALUE: 0
PIF_VALUE: 0
PIF_VALUE: 1
PIF_VALUE: 0
PIF_VALUE: 0
PIF_VALUE: 1
PIF_VALUE: 0
PIF_VALUE: 1
PIF_VALUE: 0
PIF_VALUE: 1
PIF_VALUE: 0

## 2019-04-09 ASSESSMENT — PAIN SCALES - GENERAL
PAINLEVEL_OUTOF10: 0

## 2019-04-09 ASSESSMENT — LIFESTYLE VARIABLES: SMOKING_STATUS: 1

## 2019-04-09 ASSESSMENT — PAIN DESCRIPTION - DESCRIPTORS: DESCRIPTORS: ACHING

## 2019-04-09 ASSESSMENT — PAIN - FUNCTIONAL ASSESSMENT: PAIN_FUNCTIONAL_ASSESSMENT: 0-10

## 2019-04-09 NOTE — H&P
HISTORY and Treдмитрий Bermudez 5747       NAME:  Terry Clarke  MRN: [id number]   YOB: 1980   Date: 4/9/2019   Age: 41 y.o. Gender: female       COMPLAINT AND PRESENT HISTORY:     Terry Clarke is 41 y.o.,  female, here for Rt hand Nodule, Excision. The symptoms started 5 months, initially the Pt had discharge, it then recurred again 2 months ago. 2 days ago the Pt developed a red spot on the lesion. No pain, no fever or chills. Pt denies any other symptoms.     PAST MEDICAL HISTORY     Past Medical History:   Diagnosis Date    Anxiety     Asthma     no meds, no inhalers    Chronic back pain     Cough     dry    Depression     Endometriosis     stage 4    Headache     Lazy eye     bilateral    Migraines     Osteoarthritis     tennis elbow and bursitis    Ovarian cyst     Seasonal allergies     laundry detergent, bar soap        SURGICAL HISTORY       Past Surgical History:   Procedure Laterality Date    BACK SURGERY  1996    had tailbone surgery -removed several cysts from\"[place]\"    CYST REMOVAL  1996    tailbone    DILATION AND CURETTAGE OF UTERUS      LAPAROSCOPY  9/24/15    operative,hysteroscopy, D&C    LAPAROSCOPY      GA EXCISION TUMOR SOFT TISSUE BACK/FLANK SUBQ <3CM N/A 12/19/2017    UPPER BACK AND RIGHT GROIN SEBACEOUS CYST EXCISION performed by Brayan Franco DO at [address] [place] [address] N/A 5/17/2018    OPERATIVE LAPAROSCOPY W/FULGURATION OF ENDOMETRIOSIS STAGE 2  W/CHROMOPERTIBATION performed by Lexus Matos DO at [facility] HISTORY       Family History   Problem Relation Age of Onset    Cancer Maternal Grandmother     Lung Cancer Maternal Grandmother     Diabetes Maternal Grandmother     Breast Cancer Maternal Grandmother     Arthritis Maternal Grandmother     Heart Disease Maternal Grandmother     High Blood Pressure Maternal Grandmother     High Cholesterol current or ex partner: None     Emotionally abused: None     Physically abused: None     Forced sexual activity: None   Other Topics Concern    None   Social History Narrative    None           REVIEW OF SYSTEMS      Allergies   Allergen Reactions    Vicodin [Hydrocodone-Acetaminophen] Shortness Of Breath    Hydrocodone     Tape [Adhesive Tape]      Rash to paper tape    Bactrim [Sulfamethoxazole-Trimethoprim] Nausea And Vomiting     migraines       Current Facility-Administered Medications on File Prior to Encounter   Medication Dose Route Frequency Provider Last Rate Last Dose    lactated ringers infusion   Intravenous Continuous Bi Alta Bennett MD        sodium chloride flush 0.9 % injection 10 mL  10 mL Intravenous 2 times per day Priyanka Turner MD        sodium chloride flush 0.9 % injection 10 mL  10 mL Intravenous PRN Priyanka Turner MD        ceFAZolin (ANCEF) 1 g IVPB 50mL minibag D5W  1 g Intravenous Once Ani Easton MD         Current Outpatient Medications on File Prior to Encounter   Medication Sig Dispense Refill    folic acid (FOLVITE) 1 MG tablet take 1 tablet by mouth every 6 hours 120 tablet 5    Prenatal Vit-Fe Fumarate-FA (RA PRENATAL FORMULA) 28-0.8 MG TABS take 1 tablet once daily 30 tablet 5    tiZANidine (ZANAFLEX) 4 MG tablet Take 1 tablet by mouth every 12 hours as needed (for muscle spasm) 60 tablet 0    loratadine (CLARITIN) 10 MG tablet Take 10 mg by mouth daily      albuterol sulfate HFA (PROVENTIL HFA) 108 (90 Base) MCG/ACT inhaler Inhale 2 puffs into the lungs every 6 hours as needed for Wheezing 1 Inhaler 0    benzonatate (TESSALON PERLES) 100 MG capsule Take 1 capsule by mouth 3 times daily as needed for Cough 15 capsule 0    promethazine (PHENERGAN) 25 MG tablet Take 25 mg by mouth every 6 hours as needed       ibuprofen (ADVIL;MOTRIN) 600 MG tablet Take 1 tablet by mouth every 6 hours as needed for Pain 30 tablet 0    ondansetron (ZOFRAN) 4 MG tablet Take 4 mg by mouth every 8 hours as needed for Nausea or Vomiting      DICLOFENAC PO Take 25 mg by mouth daily Takes PRN only for headaches      fluticasone (FLONASE) 50 MCG/ACT nasal spray 1 spray by Nasal route 2 times daily      meclizine (ANTIVERT) 12.5 MG tablet Take 12.5 mg by mouth 3 times daily as needed      butalbital-acetaminophen-caffeine (FIORICET, ESGIC) -40 MG per tablet Take 1 tablet by mouth every 4 hours as needed for Headaches         Negative except for what is mentioned in the HPI. GENERAL PHYSICAL EXAM     Vitals: /86   Pulse 98   Temp 98.6 °F (37 °C) (Oral)   Resp 16   Ht 5' 7\" (1.702 m)   Wt 148 lb (67.1 kg)   SpO2 97%   BMI 23.18 kg/m²  Body mass index is 23.18 kg/m². GENERAL APPEARANCE:   Wendy Aviles is 41 y.o.,  female, not obese, nourished, conscious, alert. Does not appear to be distress or pain at this time. SKIN:  Warm, dry, no cyanosis or jaundice. HEAD:  Normocephalic, atraumatic, no swelling or tenderness. EYES:  Pupils equal, reactive to light. Rt eye divergent squint. EARS:  No discharge, no marked hearing loss. NOSE:  No rhinorrhea, epistaxis or septal deformity. THROAT:  Not congested. No ulceration bleeding or discharge. NECK:  No stiffness, trachea central.  No palpable masses or L.N.                 CHEST:  Symmetrical and equal on expansion. HEART:  RRR S1 > S2. No audible murmurs or gallops. LUNGS:  Equal on expansion, normal breath sounds. No adventitious sounds. ABDOMEN:  Soft on palpation. No localized tenderness. No guarding or rigidity. No palpable hepatosplenomegaly. LYMPHATICS:  No palpable cervical lymphadenopathy. LOCOMOTOR, BACK AND SPINE:  No tenderness or deformities. EXTREMITIES:  Symmetrical, no pretibial edema.  Rt hand Nodule, 1 cm on the dorsal aspect of the 5th metacarpal area. Red spot on the lesion. Jjs sign negative. No discoloration or ulcerations. NEUROLOGIC:  The patient is conscious, alert, oriented,Cranial nerve II-XII intact, taste was not examined. No apparent focal sensory or motor deficits. PROVISIONAL DIAGNOSES / SURGERY:      Rt hand Nodule, Excision.     Patient Active Problem List    Diagnosis Date Noted    Endometriosis determined by laparoscopy Stage 2 and successful chromopertubation 5/17/2018 05/31/2018     Priority: High    Endometriosis of peritoneum 9/2015 L-Scope Stage 3-4 10/31/2017     Priority: Medium    5/17/18 Operative Laparoscopy w/ Fulgeration of Endometriosis (Stage 2), STERLING, and Chromopertibation 05/17/2018    Acute back pain with sciatica, left     Chronic knee pain 08/16/2017    Chronic right shoulder pain 03/09/2017    Amenorrhea 02/03/2017    Breast discharge 02/03/2017    Olecranon bursitis of right elbow 09/20/2016    Sebaceous cyst 08/26/2016    Ganglion cyst 08/18/2016    Wrist pain, chronic 08/12/2016    Chronic bilateral low back pain with bilateral sciatica 08/12/2016    Arthralgia of both elbows 06/13/2016    Arthritis 06/13/2016    Pelvic pain in female 05/19/2015           JOSE MARTIN DE DIOS PA-C on 4/9/2019 at 7:10 AM

## 2019-04-09 NOTE — ANESTHESIA PRE PROCEDURE
Department of Anesthesiology  Preprocedure Note       Name:  Luis Felipe Ohara   Age:  41 y.o.  :  1980                                          MRN:  [id number]         Date:  2019      Surgeon: Safia Thompson):  Naima Poole DO    Procedure: HAND LESION BIOPSY EXCISION (Right )    Medications prior to admission:   Prior to Admission medications    Medication Sig Start Date End Date Taking?  Authorizing Provider   folic acid (FOLVITE) 1 MG tablet take 1 tablet by mouth every 6 hours 19  Yes YESSENIA Madrid - HARSH   Prenatal Vit-Fe Fumarate-FA (RA PRENATAL FORMULA) 28-0.8 MG TABS take 1 tablet once daily 18  Yes Chad Bobby APRN - CNP   tiZANidine (ZANAFLEX) 4 MG tablet Take 1 tablet by mouth every 12 hours as needed (for muscle spasm) 18  Yes YESSENIA Samuel - CNP   loratadine (CLARITIN) 10 MG tablet Take 10 mg by mouth daily   Yes Historical Provider, MD   albuterol sulfate HFA (PROVENTIL HFA) 108 (90 Base) MCG/ACT inhaler Inhale 2 puffs into the lungs every 6 hours as needed for Wheezing 9/15/18   Nika Wright APRN - CNP   benzonatate (TESSALON PERLES) 100 MG capsule Take 1 capsule by mouth 3 times daily as needed for Cough 9/15/18   YESSENIA Read - CNP   promethazine (PHENERGAN) 25 MG tablet Take 25 mg by mouth every 6 hours as needed  18   Historical Provider, MD   ibuprofen (ADVIL;MOTRIN) 600 MG tablet Take 1 tablet by mouth every 6 hours as needed for Pain 18   Nenita Dinero DO   ondansetron (ZOFRAN) 4 MG tablet Take 4 mg by mouth every 8 hours as needed for Nausea or Vomiting    Historical Provider, MD   DICLOFENAC PO Take 25 mg by mouth daily Takes PRN only for headaches    Historical Provider, MD   fluticasone (FLONASE) 50 MCG/ACT nasal spray 1 spray by Nasal route 2 times daily    Historical Provider, MD   meclizine (ANTIVERT) 12.5 MG tablet Take 12.5 mg by mouth 3 times daily as needed    Historical Provider, MD   butalbital-acetaminophen-caffeine (FIORICET, ESGIC) -40 MG per tablet Take 1 tablet by mouth every 4 hours as needed for Headaches    Historical Provider, MD       Current medications:    Current Facility-Administered Medications   Medication Dose Route Frequency Provider Last Rate Last Dose    lactated ringers infusion   Intravenous Continuous Sina Murdock MD         Facility-Administered Medications Ordered in Other Encounters   Medication Dose Route Frequency Provider Last Rate Last Dose    lactated ringers infusion   Intravenous Continuous Bi Yris Brito MD        sodium chloride flush 0.9 % injection 10 mL  10 mL Intravenous 2 times per day Anastasiya Lagunas MD        sodium chloride flush 0.9 % injection 10 mL  10 mL Intravenous PRN Anastasiya Lagunas MD        ceFAZolin (ANCEF) 1 g IVPB 50mL minibag D5W  1 g Intravenous Once Huber Adler MD           Allergies:     Allergies   Allergen Reactions    Vicodin [Hydrocodone-Acetaminophen] Shortness Of Breath    Hydrocodone     Tape Ernesta Brocks Tape]      Rash to paper tape    Bactrim [Sulfamethoxazole-Trimethoprim] Nausea And Vomiting     migraines       Problem List:    Patient Active Problem List   Diagnosis Code    Pelvic pain in female R10.2    Arthralgia of both elbows M25.521, M25.522    Arthritis M19.90    Wrist pain, chronic M25.539, G89.29    Chronic bilateral low back pain with bilateral sciatica M54.42, M54.41, G89.29    Ganglion cyst M67.40    Sebaceous cyst L72.3    Olecranon bursitis of right elbow M70.21    Amenorrhea N91.2    Breast discharge N64.52    Chronic right shoulder pain M25.511, G89.29    Chronic knee pain M25.569, G89.29    Endometriosis of peritoneum 9/2015 L-Scope Stage 3-4 N80.3    Acute back pain with sciatica, left M54.42    5/17/18 Operative Laparoscopy w/ Fulgeration of Endometriosis (Stage 2), STERLING, and Chromopertibation Z98.890    Endometriosis determined by laparoscopy Stage 2 and successful chromopertubation 5/17/2018 148 lb (67.1 kg)   09/15/18 145 lb (65.8 kg)     Body mass index is 23.18 kg/m². CBC:   Lab Results   Component Value Date    WBC 6.8 05/08/2018    RBC 5.00 05/08/2018    RBC 4.55 06/14/2016    HGB 15.7 05/08/2018    HCT 46.1 05/08/2018    MCV 92.1 05/08/2018    RDW 13.3 05/08/2018     05/08/2018       CMP:   Lab Results   Component Value Date     06/14/2016    K 3.9 06/14/2016     06/14/2016    CO2 27 06/14/2016    BUN 6 06/14/2016    CREATININE 0.5 06/14/2016    GFRAA >60 05/11/2016    LABGLOM >60 05/11/2016    GLUCOSE 92 06/14/2016    PROT 7.1 07/13/2018    CALCIUM 9.3 06/14/2016    BILITOT 0.63 07/13/2018    ALKPHOS 110 07/13/2018    AST 15 07/13/2018    ALT 9 07/13/2018       POC Tests: No results for input(s): POCGLU, POCNA, POCK, POCCL, POCBUN, POCHEMO, POCHCT in the last 72 hours. Coags: No results found for: PROTIME, INR, APTT    HCG (If Applicable):   Lab Results   Component Value Date    PREGTESTUR negative 04/18/2018    HCG NEGATIVE 05/17/2018    HCGQUANT <1 05/08/2018        ABGs: No results found for: PHART, PO2ART, SLB0QND, DPW6VZE, BEART, E9HBJZZP     Type & Screen (If Applicable):  No results found for: [facility]    Anesthesia Evaluation  Patient summary reviewed and Nursing notes reviewed no history of anesthetic complications:   Airway: Mallampati: II  TM distance: >3 FB   Neck ROM: full  Mouth opening: > = 3 FB Dental: normal exam         Pulmonary:normal exam    (+) asthma: current smoker          Patient smoked on day of surgery. Cardiovascular:Negative CV ROS                      Neuro/Psych:   (+) neuromuscular disease:, headaches:, psychiatric history:depression/anxiety             GI/Hepatic/Renal: Neg GI/Hepatic/Renal ROS            Endo/Other: Negative Endo/Other ROS                    Abdominal:           Vascular:                                        Anesthesia Plan      MAC     ASA 2       Induction: intravenous.     MIPS: Postoperative opioids intended and Prophylactic antiemetics administered.                       Zaynab Painting MD   4/9/2019

## 2019-04-09 NOTE — ANESTHESIA POSTPROCEDURE EVALUATION
Department of Anesthesiology  Postprocedure Note    Patient: Stephany Mccann  MRN: [id number]  YOB: 1980  Date of evaluation: 4/9/2019  Time:  8:56 AM     Procedure Summary     Date:  04/09/19 Room / Location:  [address] / [address]    Anesthesia Start:  0917 Anesthesia Stop:  0815    Procedure:  HAND LESION BIOPSY EXCISION (Right ) Diagnosis:  (RT HAND NODULE                 PAT ON ADMIT PER ANES)    Surgeon:  Rodger Hernández DO Responsible Provider:  Colletta Emerald, MD    Anesthesia Type:  MAC ASA Status:  2          Anesthesia Type: MAC    Kal Phase I: Kal Score: 9    Kal Phase II:      Last vitals: Reviewed and per EMR flowsheets.        Anesthesia Post Evaluation    Comments: POST- ANESTHESIA EVALUATION       Pt Name: Stephany Mccann  MRN: [id number]  YOB: 1980  Date of evaluation: 4/9/2019  Time:  8:56 AM      /74   Pulse 67   Temp 97.9 °F (36.6 °C)   Resp 15   Ht 5' 7\" (1.702 m)   Wt 148 lb (67.1 kg)   SpO2 98%   BMI 23.18 kg/m²      Consciousness Level  Awake  Cardiopulmonary Status  Stable  Pain Adequately Treated YES  Nausea / Vomiting  NO  Adequate Hydration  YES  Anesthesia Related Complications NONE      Electronically signed by Colletta Emerald, MD on 4/9/2019 at 8:56 AM

## 2019-04-09 NOTE — OP NOTE
Operative Note      PATIENT NAME:  Cheko Gomez RECORD NO. [id number]                DATE OF PROCEDURE:  4/9/2019  PRIMARY CARE PHYSICIAN:  Freeman Meier, APRN - CNP  PREOPERATIVE DIAGNOSIS:  Mass, right hypothenar eminence  POSTOPERATIVE DIAGNOSIS:  Same  PROCEDURE PERFORMED:  Excision of mass, right hypothenar eminence, 1.5 cm. Intermediate wound closure, 2.5 cm  SURGEON:  Rodrick Townsend DO, FACS  ANESTHESIA:  Local and MAC  BLOOD LOSS:  <5 ml. SPECIMENS:  Mass with overlying skin. COMPLICATIONS:  None immediately appreciated. INDICATION FOR PROCEDURE:  Anselmo Vargas is a 41y.o. year old female who presented with signs and symptoms of right hypothenar eminence, causing discomfort. After history and physical examination was performed potential diagnostic and therapeutic modalities were discussed with the patient. Operative and nonoperative management was discussed. Risks, complications, benefits were reviewed. She was given the opportunity to ask questions. Once answered an informed consent was obtained. The patient was brought to the operating room on 4/9/2019. PROCEDURE:   The patient received preoperative antibiotic, DVT and GI prophylaxis. She was taken to the operating room, placed on the operative table in the supine position. MAC anesthesia was administered. The operative site was prepped and draped in the usual sterile fashion. Time out was called. After infiltration of local anesthetic, an elliptical incision was made and deepened sharply. The mass was excised from surrounding tissue using cautery. Field was hemostatic. Skin was mobilized circumferentially to allow for tension-free closure. SQ was approximated with 4-0 Vicryl. Skin was closed with 4-0 vicryl, subcuticular. Incision was washed and dried. DermaBond and steri strips applied. The patient was awakened and taken to recovery in stable condition.  Instruments, needles, and sponges were counted twice at the end of the procedure and the counts were correct.

## 2019-04-10 LAB — SURGICAL PATHOLOGY REPORT: NORMAL

## 2019-06-12 DIAGNOSIS — Z30.09 FAMILY PLANNING: ICD-10-CM

## 2019-06-14 RX ORDER — ACETAMINOPHEN, DEXTROMETHORPHAN HBR, DOXYLAMINE SUCCINATE 650; 30; 12.5 MG/30ML; MG/30ML; MG/30ML
SOLUTION ORAL
Qty: 30 TABLET | Refills: 5 | Status: SHIPPED | OUTPATIENT
Start: 2019-06-14 | End: 2019-10-23 | Stop reason: SDUPTHER

## 2019-07-29 ENCOUNTER — HOSPITAL ENCOUNTER (OUTPATIENT)
Age: 39
Setting detail: OUTPATIENT SURGERY
Discharge: HOME | End: 2019-07-29
Attending: SURGERY | Admitting: SURGERY
Payer: COMMERCIAL

## 2019-07-29 VITALS
RESPIRATION RATE: 14 BRPM | OXYGEN SATURATION: 100 % | WEIGHT: 140 LBS | HEART RATE: 78 BPM | TEMPERATURE: 98.1 F | HEIGHT: 68 IN | SYSTOLIC BLOOD PRESSURE: 121 MMHG | DIASTOLIC BLOOD PRESSURE: 89 MMHG | BODY MASS INDEX: 21.22 KG/M2

## 2019-07-29 DIAGNOSIS — L72.3 SEBACEOUS CYST: Primary | ICD-10-CM

## 2019-07-29 PROCEDURE — 3600000002 HC SURGERY LEVEL 2 BASE: Performed by: SURGERY

## 2019-07-29 PROCEDURE — 2709999900 HC NON-CHARGEABLE SUPPLY: Performed by: SURGERY

## 2019-07-29 PROCEDURE — 2500000003 HC RX 250 WO HCPCS: Performed by: SURGERY

## 2019-07-29 PROCEDURE — 88304 TISSUE EXAM BY PATHOLOGIST: CPT

## 2019-07-29 PROCEDURE — 7100000011 HC PHASE II RECOVERY - ADDTL 15 MIN: Performed by: SURGERY

## 2019-07-29 PROCEDURE — 3600000012 HC SURGERY LEVEL 2 ADDTL 15MIN: Performed by: SURGERY

## 2019-07-29 PROCEDURE — 7100000010 HC PHASE II RECOVERY - FIRST 15 MIN: Performed by: SURGERY

## 2019-07-29 RX ORDER — OXYCODONE HYDROCHLORIDE AND ACETAMINOPHEN 5; 325 MG/1; MG/1
1 TABLET ORAL EVERY 6 HOURS PRN
Qty: 20 TABLET | Refills: 0 | Status: SHIPPED | OUTPATIENT
Start: 2019-07-29 | End: 2019-08-03

## 2019-07-29 ASSESSMENT — PAIN DESCRIPTION - DESCRIPTORS: DESCRIPTORS: ACHING;DISCOMFORT

## 2019-07-29 ASSESSMENT — PAIN SCALES - GENERAL: PAINLEVEL_OUTOF10: 0

## 2019-07-29 ASSESSMENT — PAIN - FUNCTIONAL ASSESSMENT: PAIN_FUNCTIONAL_ASSESSMENT: 0-10

## 2019-07-29 NOTE — DISCHARGE INSTR - DIET
 Good nutrition is important when healing from an illness, injury, or surgery. Follow any nutrition recommendations given to you during your hospital stay.  If you were given an oral nutrition supplement while in the hospital, continue to take this supplement at home. You can take it with meals, in-between meals, and/or before bedtime. These supplements can be purchased at most local grocery stores, pharmacies, and chain super-stores.  If you have any questions about your diet or nutrition, call the hospital and ask for the dietitian.     Regular diet  Drink plenty of fluids  Take Senokot-s or Dulcolax as needed until you have a bowel movement

## 2019-07-29 NOTE — OP NOTE
Operative Note      PATIENT NAME:  Cheko Gomez RECORD NO. [id number]                DATE OF PROCEDURE:  7/29/2019  PRIMARY CARE PHYSICIAN:  Julian Meier, APRN - CNP  PREOPERATIVE DIAGNOSIS:  Cyst, left upper back  POSTOPERATIVE DIAGNOSIS:  Same  PROCEDURE PERFORMED:  Excision of cyst, left upper back, 1 cm  SURGEON:  Rodrick Townsend DO, FACS  ANESTHESIA:  Local  BLOOD LOSS:  none  SPECIMENS:  Epidermoid cyst.  COMPLICATIONS:  None immediately appreciated. INDICATION FOR PROCEDURE:  Jessie Galvan is a 42y.o. year old female who presented with signs and symptoms of cyst of left upper back. After history and physical examination was performed potential diagnostic and therapeutic modalities were discussed with the patient. Operative and nonoperative management was discussed. Risks, complications, benefits were reviewed. She was given the opportunity to ask questions. Once answered an informed consent was obtained. The patient was brought to the operating room on 7/29/2019. PROCEDURE:   The patient was taken to the operating room, placed on the operative table in the prone position. The operative site was prepped and draped in the usual sterile fashion. Time out was called. After infiltration of local anesthetic, an incision was made and the cyst was dissected sharply off surrounding tissue and delivered intact. It measured 1 cm in diameter. The area was hemostatic. The skin was closed with 4-0 vicryl, subcuticular. Steri strips applied. The patient was taken to recovery in stable condition. Instruments, needles, and sponges were counted twice at the end of the procedure and the counts were correct.         Radha Fitzgerald, [address]

## 2019-07-29 NOTE — H&P
HISTORY and Tiffany Bermudez 5747       NAME:  Sonya Fernandes  MRN: [id number]   YOB: 1980   Date: 7/29/2019   Age: 42 y.o. Gender: female       COMPLAINT AND PRESENT HISTORY:     Sonya Fernandes is 42 y.o.,  female, here Lt Upper Back Cyst Excision. The symptoms started 2 weeks ago, was initially infected with discharge, became a hard nodule. The nodule is purple in color, mildly tender, no discharge. No recent falls or trauma. Pt denies any other symptoms.     PAST MEDICAL HISTORY     Past Medical History:   Diagnosis Date    Anxiety     Asthma     no meds, no inhalers    Chronic back pain     Cough     dry    Depression     Endometriosis     stage 4    Headache     Lazy eye     bilateral    Migraines     Osteoarthritis     tennis elbow and bursitis    Ovarian cyst     Seasonal allergies     laundry detergent, bar soap        SURGICAL HISTORY       Past Surgical History:   Procedure Laterality Date    BACK SURGERY  1996    had tailbone surgery -removed several cysts from\"[place]    CYST REMOVAL  1996    tailbone and right upper leg    DILATION AND CURETTAGE OF UTERUS      EXCISION LESION HAND / FINGER Right 4/9/2019    HAND LESION BIOPSY EXCISION performed by Claire Rivero DO at [facility]  9/24/15    operative,hysteroscopy, D&C    LAPAROSCOPY      [place] EXCISION TUMOR SOFT TISSUE BACK/FLANK SUBQ <3CM N/A 12/19/2017    UPPER BACK AND RIGHT GROIN SEBACEOUS CYST EXCISION performed by Claire Rivero DO at [facility] N/A 5/17/2018    OPERATIVE LAPAROSCOPY W/FULGURATION OF ENDOMETRIOSIS STAGE 2  W/CHROMOPERTIBATION performed by Hanna Gruber DO at [facility] HISTORY       Family History   Problem Relation Age of Onset    Cancer Maternal Grandmother     Lung Cancer Maternal Grandmother     Diabetes Maternal Grandmother     Breast Cancer Maternal Grandmother

## 2019-07-30 LAB — SURGICAL PATHOLOGY REPORT: NORMAL

## 2019-08-20 ENCOUNTER — HOSPITAL ENCOUNTER (OUTPATIENT)
Dept: MRI IMAGING | Age: 39
Discharge: HOME | End: 2019-08-22
Payer: COMMERCIAL

## 2019-08-20 DIAGNOSIS — G43.119 INTRACTABLE MIGRAINE WITH AURA WITHOUT STATUS MIGRAINOSUS: ICD-10-CM

## 2019-08-20 PROCEDURE — A9579 GAD-BASE MR CONTRAST NOS,1ML: HCPCS | Performed by: PSYCHIATRY & NEUROLOGY

## 2019-08-20 PROCEDURE — 2580000003 HC RX 258: Performed by: PSYCHIATRY & NEUROLOGY

## 2019-08-20 PROCEDURE — 70553 MRI BRAIN STEM W/O & W/DYE: CPT

## 2019-08-20 PROCEDURE — 6360000004 HC RX CONTRAST MEDICATION: Performed by: PSYCHIATRY & NEUROLOGY

## 2019-08-20 RX ORDER — SODIUM CHLORIDE 0.9 % (FLUSH) 0.9 %
10 SYRINGE (ML) INJECTION PRN
Status: DISCONTINUED | OUTPATIENT
Start: 2019-08-20 | End: 2019-08-23 | Stop reason: HOSPADM

## 2019-08-20 RX ADMIN — GADOTERIDOL 15 ML: 279.3 INJECTION, SOLUTION INTRAVENOUS at 16:10

## 2019-08-20 RX ADMIN — Medication 10 ML: at 16:10

## 2019-10-23 DIAGNOSIS — Z30.09 FAMILY PLANNING: ICD-10-CM

## 2019-10-23 RX ORDER — ACETAMINOPHEN, DEXTROMETHORPHAN HBR, DOXYLAMINE SUCCINATE 650; 30; 12.5 MG/30ML; MG/30ML; MG/30ML
1 SOLUTION ORAL DAILY
Qty: 30 TABLET | Refills: 2 | Status: SHIPPED | OUTPATIENT
Start: 2019-10-23 | End: 2020-02-14

## 2019-10-23 RX ORDER — FOLIC ACID 1 MG/1
1 TABLET ORAL EVERY 6 HOURS
Qty: 120 TABLET | Refills: 2 | Status: SHIPPED | OUTPATIENT
Start: 2019-10-23 | End: 2020-01-20

## 2019-12-18 ENCOUNTER — OFFICE VISIT (OUTPATIENT)
Dept: OBGYN CLINIC | Age: 39
End: 2019-12-18
Payer: COMMERCIAL

## 2019-12-18 VITALS
HEIGHT: 67 IN | DIASTOLIC BLOOD PRESSURE: 78 MMHG | WEIGHT: 147 LBS | SYSTOLIC BLOOD PRESSURE: 124 MMHG | BODY MASS INDEX: 23.07 KG/M2

## 2019-12-18 DIAGNOSIS — Z01.419 VISIT FOR GYNECOLOGIC EXAMINATION: Primary | ICD-10-CM

## 2019-12-18 PROCEDURE — 99214 OFFICE O/P EST MOD 30 MIN: CPT | Performed by: NURSE PRACTITIONER

## 2019-12-18 RX ORDER — PROPRANOLOL HYDROCHLORIDE 120 MG/1
CAPSULE, EXTENDED RELEASE ORAL
COMMUNITY
Start: 2019-10-31

## 2019-12-18 RX ORDER — SUMATRIPTAN 100 MG/1
TABLET, FILM COATED ORAL
COMMUNITY
Start: 2019-11-17

## 2019-12-18 ASSESSMENT — PATIENT HEALTH QUESTIONNAIRE - PHQ9
1. LITTLE INTEREST OR PLEASURE IN DOING THINGS: 0
SUM OF ALL RESPONSES TO PHQ QUESTIONS 1-9: 0
2. FEELING DOWN, DEPRESSED OR HOPELESS: 0
SUM OF ALL RESPONSES TO PHQ9 QUESTIONS 1 & 2: 0
SUM OF ALL RESPONSES TO PHQ QUESTIONS 1-9: 0

## 2020-01-20 RX ORDER — FOLIC ACID 1 MG/1
1 TABLET ORAL EVERY 6 HOURS
Qty: 120 TABLET | Refills: 2 | Status: SHIPPED | OUTPATIENT
Start: 2020-01-20 | End: 2020-04-14

## 2020-02-14 RX ORDER — ACETAMINOPHEN, DEXTROMETHORPHAN HBR, DOXYLAMINE SUCCINATE 650; 30; 12.5 MG/30ML; MG/30ML; MG/30ML
SOLUTION ORAL
Qty: 30 TABLET | Refills: 11 | Status: SHIPPED | OUTPATIENT
Start: 2020-02-14 | End: 2021-02-17 | Stop reason: SDUPTHER

## 2020-04-14 RX ORDER — FOLIC ACID 1 MG/1
1 TABLET ORAL EVERY 6 HOURS
Qty: 120 TABLET | Refills: 2 | Status: SHIPPED | OUTPATIENT
Start: 2020-04-14 | End: 2020-07-07

## 2020-07-07 RX ORDER — FOLIC ACID 1 MG/1
1 TABLET ORAL EVERY 6 HOURS
Qty: 120 TABLET | Refills: 2 | Status: SHIPPED | OUTPATIENT
Start: 2020-07-07 | End: 2020-09-30

## 2020-09-16 ENCOUNTER — HOSPITAL ENCOUNTER (EMERGENCY)
Age: 40
Discharge: HOME | End: 2020-09-16
Attending: EMERGENCY MEDICINE
Payer: COMMERCIAL

## 2020-09-16 VITALS
HEART RATE: 62 BPM | WEIGHT: 147 LBS | BODY MASS INDEX: 23.07 KG/M2 | DIASTOLIC BLOOD PRESSURE: 75 MMHG | TEMPERATURE: 98 F | SYSTOLIC BLOOD PRESSURE: 156 MMHG | HEIGHT: 67 IN | OXYGEN SATURATION: 99 % | RESPIRATION RATE: 14 BRPM

## 2020-09-16 PROCEDURE — 99282 EMERGENCY DEPT VISIT SF MDM: CPT

## 2020-09-16 PROCEDURE — 6370000000 HC RX 637 (ALT 250 FOR IP): Performed by: EMERGENCY MEDICINE

## 2020-09-16 RX ORDER — ONDANSETRON 4 MG/1
4 TABLET, FILM COATED ORAL 3 TIMES DAILY PRN
Qty: 15 TABLET | Refills: 0 | Status: SHIPPED | OUTPATIENT
Start: 2020-09-16

## 2020-09-16 RX ORDER — CLINDAMYCIN HYDROCHLORIDE 300 MG/1
300 CAPSULE ORAL 4 TIMES DAILY
Qty: 40 CAPSULE | Refills: 0 | Status: SHIPPED | OUTPATIENT
Start: 2020-09-16 | End: 2020-09-26

## 2020-09-16 RX ORDER — ONDANSETRON 4 MG/1
4 TABLET, ORALLY DISINTEGRATING ORAL ONCE
Status: COMPLETED | OUTPATIENT
Start: 2020-09-16 | End: 2020-09-16

## 2020-09-16 RX ORDER — CLINDAMYCIN HYDROCHLORIDE 150 MG/1
300 CAPSULE ORAL ONCE
Status: COMPLETED | OUTPATIENT
Start: 2020-09-16 | End: 2020-09-16

## 2020-09-16 RX ADMIN — CLINDAMYCIN HYDROCHLORIDE 300 MG: 150 CAPSULE ORAL at 23:44

## 2020-09-16 RX ADMIN — ONDANSETRON 4 MG: 4 TABLET, ORALLY DISINTEGRATING ORAL at 23:44

## 2020-09-16 ASSESSMENT — PAIN SCALES - GENERAL: PAINLEVEL_OUTOF10: 10

## 2020-09-17 NOTE — ED PROVIDER NOTES
EMERGENCY DEPARTMENT ENCOUNTER    Pt Name: Concepcion Ruffin  MRN: [id number]  [place] 1980  Date of evaluation: 9/16/20  CHIEF COMPLAINT       Chief Complaint   Patient presents with    Dental Pain     HISTORY OF PRESENT ILLNESS   HPI    HISTORY OF PRESENT ILLNESS:  Past medical history of endometriosis, chronic pain, presents for chief complaint of dental pain. Patient has had right lower last dental pain for the past couple days. States she does not tolerate Amoxil that she is been taking at home. States he was in [place] but she cannot tolerate amoxicillin. No fevers. No significant facial swelling. No swelling under tongue. No tongue elevation, No trouble opening mouth. No trouble speaking. No drooling. No trouble extending the neck. No neck stiffness. Severity is moderate. No aggravating or relieving factors. Timing is couple days. Course is constant. Context is no trouble speaking  -----------------------  -----------------------  REVIEW OF SYSTEMS  *see ED Caveat  ED Caveat: [none]  Gen:  No fever  CV: No CP, no palpitations  Resp: No SOB, no respiratory distress  GI: No V/D, no abd pain  : No dysuria, no increased frequency  Skin: No rash, no purulent lesions  Eyes: No blurry vision, No double vision  MSK: No back pain, no joint pain  Neuro: No HA, no sensation changes  Psych: No SI/HI  -----------------------  -----------------------  ALLERGIES  -per nursing records, reviewed    PAST MEDICAL HISTORY  -See HPI    SOCIAL HISTORY  -No daily drinking, no IV drugs  -----------------------  -----------------------  PHYSICAL EXAM  Gen: no acute distress  Skin: no rashes  Head: Normocephalic, atraumatic  Neck: no nuchal rigidity  Eye: PERRLA, normal conjunctiva  ENT: Mucous membranes moist, airway patent. No drooling,  no tripoding,  no elevation of the tongue or swelling in the floor of the mouth, no difficulty extending the neck, no changes in phonation, no uvular deviation.   CV: Normal rate  Resp: Respirations unlabored  MSK: no large joint effusions  ABD: Non distended  Neuro: Alert and oriented, no focal neurological deficits observed  Psych: Cooperative  -----------------------  -----------------------  MEDICAL DECISION MAKING  Differential Diagnosis:  - Consideration is given for    submandibular abscess, peritonsillar abscess, dental abscess, retropharyngeal abscess, angioedema, epiglottitis,  -  #Impression/Plan:  - Clinically patient's presentation is most consistent with uncomplicated dental pain. -   -----------------------  -----------------------  Verona Turner MD, RODY  Emergency Medicine Attending  Questions? Please contact my cell phone anytime. [phone]  *This charting supersedes any ED resident or staff charting and was written using speech recognition software      ## The patient was evaluated during the global COVID-19 pandemic, and that diagnosis was suspected/considered upon their initial presentation.       PASTMEDICAL HISTORY     Past Medical History:   Diagnosis Date    Anxiety     Asthma     no meds, no inhalers    Chronic back pain     Cough     dry    Depression     Endometriosis     stage 4    Headache     Lazy eye     bilateral    Migraines     Osteoarthritis     tennis elbow and bursitis    Ovarian cyst     Seasonal allergies     laundry detergent, bar soap      SURGICAL HISTORY       Past Surgical History:   Procedure Laterality Date    BACK SURGERY  1996    had tailbone surgery -removed several cysts from\"[place]\"    CYST REMOVAL  1996    tailbone and right upper leg    DILATION AND CURETTAGE OF UTERUS      EXCISION LESION HAND / FINGER Right 4/9/2019    HAND LESION BIOPSY EXCISION performed by Gagan Mi DO at [facility]  9/24/15    operative,hysteroscopy, D&C    LAPAROSCOPY      [place] EXCISION TUMOR SOFT TISSUE BACK/FLANK SUBQ <3CM N/A 12/19/2017    UPPER BACK AND RIGHT GROIN SEBACEOUS CYST EXCISION performed by Alise Roberto DO [place] at [address] TUMOR SOFT TISSUE BACK/FLANK SUBQ <3CM Left 7/29/2019    EXCISION CYST UPPER BACK performed by Leonides Boxer, DO at [facility] N/A 5/17/2018    OPERATIVE LAPAROSCOPY W/FULGURATION OF ENDOMETRIOSIS STAGE 2  W/CHROMOPERTIBATION performed by Toribio Sacks, DO at [facility]       Previous Medications    ALBUTEROL SULFATE HFA (PROVENTIL HFA) 108 (90 BASE) MCG/ACT INHALER    Inhale 2 puffs into the lungs every 6 hours as needed for Wheezing    BENZONATATE (TESSALON PERLES) 100 MG CAPSULE    Take 1 capsule by mouth 3 times daily as needed for Cough    BUTALBITAL-ACETAMINOPHEN-CAFFEINE (FIORICET, ESGIC) -40 MG PER TABLET    Take 1 tablet by mouth every 4 hours as needed for Headaches    DICLOFENAC PO    Take 25 mg by mouth daily Takes PRN only for headaches    FLUTICASONE (FLONASE) 50 MCG/ACT NASAL SPRAY    1 spray by Nasal route 2 times daily    FOLIC ACID (FOLVITE) 1 MG TABLET    take 1 tablet by mouth every 6 hours    IBUPROFEN (ADVIL;MOTRIN) 600 MG TABLET    Take 1 tablet by mouth every 6 hours as needed for Pain    LORATADINE (CLARITIN) 10 MG TABLET    Take 10 mg by mouth daily    MECLIZINE (ANTIVERT) 12.5 MG TABLET    Take 12.5 mg by mouth 3 times daily as needed    PRENATAL VIT-FE FUMARATE-FA (RA PRENATAL FORMULA) 28-0.8 MG TABS    take 1 tablet by mouth once daily    PROMETHAZINE (PHENERGAN) 25 MG TABLET    Take 25 mg by mouth every 6 hours as needed     PROPRANOLOL (INDERAL LA) 120 MG EXTENDED RELEASE CAPSULE    take 1 capsule by mouth once daily    SUMATRIPTAN (IMITREX) 100 MG TABLET        TIZANIDINE (ZANAFLEX) 4 MG TABLET    Take 1 tablet by mouth every 12 hours as needed (for muscle spasm)     ALLERGIES     is allergic to vicodin [hydrocodone-acetaminophen]; hydrocodone; tape [adhesive tape]; and bactrim [sulfamethoxazole-trimethoprim].   FAMILY HISTORY     She indicated that her mother is

## 2020-09-17 NOTE — ED TRIAGE NOTES
Pt presents in ED c/o dental pain; facial swelling and a sore throat; pt denies difficulty breathing; pt stated that she was recently prescribed amoxicillin and has taken it for two days; pt stated the medication \"makes her feel funny. \" Pt stated that her son is allergic to amoxicillin so she assumes she is as well. Pt requesting a different ATB. Denies further complaints.

## 2020-09-30 RX ORDER — FOLIC ACID 1 MG/1
1 TABLET ORAL EVERY 6 HOURS
Qty: 120 TABLET | Refills: 1 | Status: SHIPPED | OUTPATIENT
Start: 2020-09-30 | End: 2020-11-27

## 2020-11-27 RX ORDER — FOLIC ACID 1 MG/1
1 TABLET ORAL EVERY 6 HOURS
Qty: 120 TABLET | Refills: 1 | Status: SHIPPED | OUTPATIENT
Start: 2020-11-27 | End: 2021-02-17 | Stop reason: SDUPTHER

## 2021-01-26 ENCOUNTER — TELEPHONE (OUTPATIENT)
Dept: OBGYN CLINIC | Age: 41
End: 2021-01-26

## 2021-02-16 DIAGNOSIS — Z30.09 FAMILY PLANNING: ICD-10-CM

## 2021-02-17 DIAGNOSIS — Z30.09 FAMILY PLANNING: ICD-10-CM

## 2021-02-17 RX ORDER — ACETAMINOPHEN, DEXTROMETHORPHAN HBR, DOXYLAMINE SUCCINATE 650; 30; 12.5 MG/30ML; MG/30ML; MG/30ML
SOLUTION ORAL
Qty: 30 TABLET | Refills: 11 | Status: SHIPPED | OUTPATIENT
Start: 2021-02-17 | End: 2022-03-14 | Stop reason: SDUPTHER

## 2021-02-17 RX ORDER — FOLIC ACID 1 MG/1
1 TABLET ORAL EVERY 6 HOURS
Qty: 120 TABLET | Refills: 1 | Status: SHIPPED | OUTPATIENT
Start: 2021-02-17 | End: 2021-04-13

## 2021-02-17 RX ORDER — PNV NO.95/FERROUS FUM/FOLIC AC 28MG-0.8MG
TABLET ORAL
Qty: 30 TABLET | Refills: 11 | OUTPATIENT
Start: 2021-02-17

## 2021-02-22 ENCOUNTER — OFFICE VISIT (OUTPATIENT)
Dept: OBGYN CLINIC | Age: 41
End: 2021-02-22
Payer: COMMERCIAL

## 2021-02-22 VITALS
SYSTOLIC BLOOD PRESSURE: 118 MMHG | WEIGHT: 141 LBS | HEIGHT: 67 IN | DIASTOLIC BLOOD PRESSURE: 72 MMHG | BODY MASS INDEX: 22.13 KG/M2 | TEMPERATURE: 97.9 F

## 2021-02-22 DIAGNOSIS — Z01.419 WOMEN'S ANNUAL ROUTINE GYNECOLOGICAL EXAMINATION: Primary | ICD-10-CM

## 2021-02-22 PROBLEM — N91.2 AMENORRHEA: Status: RESOLVED | Noted: 2017-02-03 | Resolved: 2021-02-22

## 2021-02-22 PROCEDURE — 99214 OFFICE O/P EST MOD 30 MIN: CPT | Performed by: NURSE PRACTITIONER

## 2021-02-22 PROCEDURE — G8484 FLU IMMUNIZE NO ADMIN: HCPCS | Performed by: NURSE PRACTITIONER

## 2021-02-22 NOTE — PROGRESS NOTES
History and Physical  [address], [address] [place] [address] [address]. [phone]   Fax [phone]  Annie Carney  2019              47 y.o. Chief Complaint   Patient presents with    Gynecologic Exam       Patient's last menstrual period was 2021. Primary Care Physician: YESSENIA Temple - CNP    The patient was seen and examined. She has no chief complaint today and is here for her annual exam.  Her bowels are regular. There are no voiding complaints. She denies any bloating. She denies vaginal discharge and was counseled on STD's and the need for barrier contraception.      HPI : Annie Carney is a 47 y.o. female     Annual exam  No chief complaint   ________________________________________________________________________  OB History    Para Term  AB Living   1 1 1 0 0 1   SAB TAB Ectopic Molar Multiple Live Births   0 0 0 0 0 1      # Outcome Date GA Lbr Jaspreet/2nd Weight Sex Delivery Anes PTL Lv   1 Term 05/10/08   7 lb 1 oz (3.204 kg) M Vag-Spont   GENIA      Name: Cortez Regalado     Past Medical History:   Diagnosis Date    Anxiety     Asthma     no meds, no inhalers    Chronic back pain     Cough     dry    Depression     Endometriosis     stage 4    Headache     Lazy eye     bilateral    Migraines     Osteoarthritis     tennis elbow and bursitis    Ovarian cyst     Seasonal allergies     laundry detergent, bar soap                                                                    Past Surgical History:   Procedure Laterality Date   [facility]    had tailbone surgery -removed several cysts from\"[place]\"    CYST REMOVAL  1996    tailbone and right upper leg    DILATION AND CURETTAGE OF UTERUS      EXCISION LESION HAND / FINGER Right 2019    HAND LESION BIOPSY EXCISION performed by Katherine Gallegos DO at [facility]  9/24/15    operative,hysteroscopy, D&C    LAPAROSCOPY      [place] EXCISION TUMOR SOFT TISSUE BACK/FLANK SUBQ <3CM N/A 12/19/2017    UPPER BACK AND RIGHT GROIN SEBACEOUS CYST EXCISION performed by Sonia Kidd DO at [address] SOFT TISSUE BACK/FLANK SUBQ <3CM Left 7/29/2019    EXCISION CYST UPPER BACK performed by Sonia Kidd DO at [address] [facility] N/A 5/17/2018    OPERATIVE LAPAROSCOPY W/FULGURATION OF ENDOMETRIOSIS STAGE 2  W/CHROMOPERTIBATION performed by Alexus Estrada DO at [address]       Family History   Problem Relation Age of Onset    Cancer Maternal Grandmother     Lung Cancer Maternal Grandmother     Diabetes Maternal Grandmother     Breast Cancer Maternal Grandmother     Arthritis Maternal Grandmother     Heart Disease Maternal Grandmother     High Blood Pressure Maternal Grandmother     High Cholesterol Maternal Grandmother     Vision Loss Maternal Grandmother     Arthritis Mother     High Blood Pressure Mother     High Cholesterol Mother     Arthritis Father     High Blood Pressure Father     Stroke Father     Miscarriages / [place] Sister     Arthritis Maternal Grandfather     Asthma Maternal Grandfather     Cancer Maternal Grandfather     Heart Disease Maternal Grandfather     High Blood Pressure Maternal Grandfather     High Cholesterol Maternal Grandfather     Arthritis Paternal Grandmother     Cancer Paternal Grandmother     Heart Disease Paternal Grandmother     High Blood Pressure Paternal Grandmother     Arthritis Paternal Grandfather     Cancer Paternal Grandfather     Heart Disease Paternal Grandfather     High Blood Pressure Paternal Grandfather      Social History     Socioeconomic History    Marital status: Single     Spouse name: Not on file    Number of children: Not on file    Years of education: Not on file    Highest education level: Not on file   Occupational History    Not on file   Social Needs    Financial resource strain: Not on file    Food insecurity     Worry: Not on file     Inability: Not on file    Transportation needs     Medical: Not on file     Non-medical: Not on file   Tobacco Use    Smoking status: Current Every Day Smoker     Packs/day: 1.00     Years: 32.00     Pack years: 32.00     Types: Cigarettes    Smokeless tobacco: Never Used   Substance and Sexual Activity    Alcohol use:  Yes     Alcohol/week: 0.0 standard drinks     Comment: rare    Drug use: No    Sexual activity: Never     Partners: Male   Lifestyle    Physical activity     Days per week: Not on file     Minutes per session: Not on file    Stress: Not on file   Relationships    Social connections     Talks on phone: Not on file     Gets together: Not on file     Attends Mu-ism service: Not on file     Active member of club or organization: Not on file     Attends meetings of clubs or organizations: Not on file     Relationship status: Not on file    Intimate partner violence     Fear of current or ex partner: Not on file     Emotionally abused: Not on file     Physically abused: Not on file     Forced sexual activity: Not on file   Other Topics Concern    Not on file   Social History Narrative    Not on file       MEDICATIONS:  Current Outpatient Medications   Medication Sig Dispense Refill    folic acid (FOLVITE) 1 MG tablet Take 1 tablet by mouth every 6 hours 120 tablet 1    Prenatal Vit-Fe Fumarate-FA (RA PRENATAL FORMULA) 28-0.8 MG TABS take 1 tablet by mouth once daily 30 tablet 11    ondansetron (ZOFRAN) 4 MG tablet Take 1 tablet by mouth 3 times daily as needed for Nausea or Vomiting 15 tablet 0    propranolol (INDERAL LA) 120 MG extended release capsule take 1 capsule by mouth once daily      SUMAtriptan (IMITREX) 100 MG tablet       albuterol sulfate HFA (PROVENTIL HFA) 108 (90 Base) MCG/ACT inhaler Inhale 2 puffs into the lungs every 6 hours as needed for Wheezing 1 Inhaler 0    promethazine (PHENERGAN) 25 MG tablet Take 25 mg by mouth every 6 hours as needed       ibuprofen (ADVIL;MOTRIN) 600 MG tablet Take 1 tablet by mouth every 6 hours as needed for Pain 30 tablet 0    DICLOFENAC PO Take 25 mg by mouth daily Takes PRN only for headaches      tiZANidine (ZANAFLEX) 4 MG tablet Take 1 tablet by mouth every 12 hours as needed (for muscle spasm) 60 tablet 0    loratadine (CLARITIN) 10 MG tablet Take 10 mg by mouth daily      butalbital-acetaminophen-caffeine (FIORICET, ESGIC) -40 MG per tablet Take 1 tablet by mouth every 4 hours as needed for Headaches      benzonatate (TESSALON PERLES) 100 MG capsule Take 1 capsule by mouth 3 times daily as needed for Cough (Patient not taking: Reported on 2/22/2021) 15 capsule 0    fluticasone (FLONASE) 50 MCG/ACT nasal spray 1 spray by Nasal route 2 times daily      meclizine (ANTIVERT) 12.5 MG tablet Take 12.5 mg by mouth 3 times daily as needed       Current Facility-Administered Medications   Medication Dose Route Frequency Provider Last Rate Last Admin    leuprolide (LUPRON) injection 3.75 mg  3.75 mg Intramuscular Once [employer], [employer], APRN - CNP         Facility-Administered Medications Ordered in Other Visits   Medication Dose Route Frequency Provider Last Rate Last Admin    lactated ringers infusion   Intravenous Continuous Bi Celine Norman MD        sodium chloride flush 0.9 % injection 10 mL  10 mL Intravenous 2 times per day Paulie Raya MD        sodium chloride flush 0.9 % injection 10 mL  10 mL Intravenous PRN Paulie Raya MD        ceFAZolin (ANCEF) 1 g IVPB 50mL minibag D5W  1 g Intravenous Once Cornelia Gates MD               ALLERGIES:  Allergies as of 02/22/2021 - Review Complete 02/22/2021   Allergen Reaction Noted    Vicodin [hydrocodone-acetaminophen] Shortness Of Breath 03/04/2015    Hydrocodone  05/01/2015    Tape [adhesive tape]  05/08/2018    Bactrim [sulfamethoxazole-trimethoprim] Nausea And Vomiting 06/13/2016       Symptoms heart was in a regular rate and rhythm. . No S3 or S4. There was no murmur appreciated. Location, grade, and radiation are not applicable. Extremities: The patients extremities were without calf tenderness, edema, or varicosities. There was full range of motion in all four extremities. Pulses in all four extremities were appreciated and are 2/4. Abdomen: The abdomen was soft and non-tender. There were good bowel sounds in all quadrants and there was no guarding, rebound or rigidity. On evaluation there was no evidence of hepatosplenomegaly and there was no costal vertebral traci tenderness bilaterally. No hernias were appreciated. Abdominal Scars: none    Psych: The patient had a normal Orientation to: Time, Place, Person, and Situation  There is no Mood / Affect changes    Breast:  (Chest)  normal appearance, no masses or tenderness  Self breast exams were reviewed in detail. Literature was given. Pelvic Exam:  Vulva and vagina appear normal. Bimanual exam reveals normal uterus and adnexa. Rectal Exam:  exam declined by patient          Musculosk:  Normal Gait and station was noted. Digits were evaluated without abnormal findings. Range of motion, stability and strength were evaluated and found to be appropriate for the patients age. ASSESSMENT:      47 y.o. Annual   Diagnosis Orders   1.  Women's annual routine gynecological examination            Chief Complaint   Patient presents with    Gynecologic Exam          Past Medical History:   Diagnosis Date    Anxiety     Asthma     no meds, no inhalers    Chronic back pain     Cough     dry    Depression     Endometriosis     stage 4    Headache     Lazy eye     bilateral    Migraines     Osteoarthritis     tennis elbow and bursitis    Ovarian cyst     Seasonal allergies     laundry detergent, bar soap          Patient Active Problem List    Diagnosis Date Noted    Endometriosis determined by laparoscopy Stage 2 and successful chromopertubation 5/17/2018 05/31/2018     Priority: High    Endometriosis of peritoneum 9/2015 L-Scope Stage 3-4 10/31/2017     Priority: Medium    5/17/18 Operative Laparoscopy w/ Fulgeration of Endometriosis (Stage 2), STERLING, and Chromopertibation 05/17/2018    Acute back pain with sciatica, left     Chronic knee pain 08/16/2017    Chronic right shoulder pain 03/09/2017    Breast discharge 02/03/2017    Olecranon bursitis of right elbow 09/20/2016    Sebaceous cyst 08/26/2016    Ganglion cyst 08/18/2016    Wrist pain, chronic 08/12/2016    Chronic bilateral low back pain with bilateral sciatica 08/12/2016    Arthralgia of both elbows 06/13/2016    Arthritis 06/13/2016    Pelvic pain in female 05/19/2015          Hereditary Breast, Ovarian, Colon and Uterine Cancer screening Done. Tobacco & Secondary smoke risks reviewed; instructed on cessation and avoidance      Counseling Completed:  Preventative Health Recommendations and Follow up. The patient was informed of the recommended preventative health recommendations. 1. Annuals every year; Cytology collections per prevailing guidelines. 2. Mammograms begin every year at 37 yo if no abnormalities are found and no family history. 3. Bone density studies every 2-3 years. Begin at 71 yo. If no fracture history or osteoporosis family history. (significant). 4. Colonoscopy begin at 38 yo. Repeat every ten years if negative and no family history. 5. Calcium of 7261-4445 mg/day in split dosing  6. Vitamin D 400-800 IU/day  7. All other preventative health recommendations will be managed by the patients Primary care physician. PLAN:  Return in about 1 year (around 2/22/2022) for annual.  Repeat Annual every 1 year  Cervical Cytology Evaluation begins at 24years old. If Negative Cytology, Follow-up screening per current guidelines. Mammograms every 1 year. If 37 yo and last mammogram was negative.   Calcium and Vitamin D dosing reviewed. Colonoscopy screening reviewed as well as onset for bone density testing. Birth control and barrier recommendations discussed. STD counseling and prevention reviewed. Gardisil counseling completed for all patients 10-35 yo. Routine health maintenance per patients PCP. No orders of the defined types were placed in this encounter. The patient, Anuradha Chi is a 47 y.o. female, was seen with a total time spent of 30 minutes for the visit on this date of service by the E/M provider. The time component had both face to face and non face to face time spent in determining the total time component. Counseling and education regarding her diagnosis listed below and her options regarding those diagnoses were also included in determining her time component. Diagnosis Orders   1. Women's annual routine gynecological examination          The patient had her preventative health maintenance recommendations and follow-up reviewed with her at the completion of her visit.

## 2021-04-13 DIAGNOSIS — Z30.09 FAMILY PLANNING: ICD-10-CM

## 2021-04-13 RX ORDER — FOLIC ACID 1 MG/1
1 TABLET ORAL EVERY 6 HOURS
Qty: 120 TABLET | Refills: 1 | Status: SHIPPED | OUTPATIENT
Start: 2021-04-13 | End: 2021-06-09

## 2021-05-11 ENCOUNTER — HOSPITAL ENCOUNTER (OUTPATIENT)
Age: 41
Setting detail: SPECIMEN
Discharge: HOME | End: 2021-05-11
Payer: COMMERCIAL

## 2021-05-11 ENCOUNTER — OFFICE VISIT (OUTPATIENT)
Dept: OBGYN CLINIC | Age: 41
End: 2021-05-11
Payer: COMMERCIAL

## 2021-05-11 VITALS
HEIGHT: 67 IN | DIASTOLIC BLOOD PRESSURE: 68 MMHG | BODY MASS INDEX: 23.39 KG/M2 | WEIGHT: 149 LBS | HEART RATE: 97 BPM | SYSTOLIC BLOOD PRESSURE: 118 MMHG

## 2021-05-11 DIAGNOSIS — R10.2 PELVIC PAIN IN FEMALE: Primary | ICD-10-CM

## 2021-05-11 DIAGNOSIS — N94.6 DYSMENORRHEA: ICD-10-CM

## 2021-05-11 DIAGNOSIS — N94.19 DYSPAREUNIA DUE TO MEDICAL CONDITION IN FEMALE: ICD-10-CM

## 2021-05-11 DIAGNOSIS — N80.30 ENDOMETRIOSIS OF PERITONEUM: ICD-10-CM

## 2021-05-11 DIAGNOSIS — F17.200 SMOKER: ICD-10-CM

## 2021-05-11 DIAGNOSIS — Z98.890 S/P LAPAROSCOPY: ICD-10-CM

## 2021-05-11 LAB
-: ABNORMAL
AMORPHOUS: ABNORMAL
BACTERIA: ABNORMAL
BILIRUBIN URINE: NEGATIVE
CASTS UA: ABNORMAL /LPF
COLOR: ABNORMAL
COMMENT UA: ABNORMAL
CRYSTALS, UA: ABNORMAL /HPF
CRYSTALS, UA: ABNORMAL /HPF
EPITHELIAL CELLS UA: ABNORMAL /HPF
GLUCOSE URINE: NEGATIVE
KETONES, URINE: NEGATIVE
LEUKOCYTE ESTERASE, URINE: NEGATIVE
MUCUS: ABNORMAL
NITRITE, URINE: NEGATIVE
OTHER OBSERVATIONS UA: ABNORMAL
PH UA: 5.5 (ref 5–8)
PROTEIN UA: NEGATIVE
RBC UA: ABNORMAL /HPF
RENAL EPITHELIAL, UA: ABNORMAL /HPF
SPECIFIC GRAVITY UA: 1.02 (ref 1–1.03)
TRICHOMONAS: ABNORMAL
TURBIDITY: ABNORMAL
URINE HGB: NEGATIVE
UROBILINOGEN, URINE: NORMAL
WBC UA: ABNORMAL /HPF
YEAST: ABNORMAL

## 2021-05-11 PROCEDURE — 4004F PT TOBACCO SCREEN RCVD TLK: CPT | Performed by: OBSTETRICS & GYNECOLOGY

## 2021-05-11 PROCEDURE — 99213 OFFICE O/P EST LOW 20 MIN: CPT | Performed by: OBSTETRICS & GYNECOLOGY

## 2021-05-11 PROCEDURE — G8420 CALC BMI NORM PARAMETERS: HCPCS | Performed by: OBSTETRICS & GYNECOLOGY

## 2021-05-11 PROCEDURE — G8427 DOCREV CUR MEDS BY ELIG CLIN: HCPCS | Performed by: OBSTETRICS & GYNECOLOGY

## 2021-05-11 PROCEDURE — 81001 URINALYSIS AUTO W/SCOPE: CPT

## 2021-05-11 NOTE — PROGRESS NOTES
Anastasiia Markham  2021      Anastasiia Markham is a 47 y.o. female       The patient was seen today. She was here to follow-up regarding her labs and diagnostics ordered at her last visit for the diagnosis of:    ICD-10-CM    1. Pelvic pain in female  R10.2 [address]     Urinalysis Reflex to Culture   2. 18 Operative Laparoscopy w/ Fulgeration of Endometriosis (Stage 2), STERLING, and Chromopertibation  Z98.890    3. Endometriosis of peritoneum 2015 L-Scope Stage 3-4  N80.3    4. Dysmenorrhea  N94.6    5. Dyspareunia due to medical condition in female  N94.19    6. Smoker  F17.200        Her bowels are regular and she is voiding without difficulty. Pt with pelvic pain and dysmenorrhea that starts prior to her cycle and is crescendo 3 days into cycle. Her pain prevents her from her parental responsibilities of her 7 yo son. She has failed NSAIDS, OCP's, Lupron. Narcotics. 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 Pt wishing definitive surgery. With hysterectomy but wishes to maintain ovaries. I reviewed the risk of persistent symptomology vs menopausal symptoms. She wishes suppressive therapy after hyst and FOI/STERLING and is aware the risks of a second surgery. Pt states does not wish any future fertility. Currently not sexually active d/t pain . No coitus 6-9 months no discharge or odor.       Past Medical History:   Diagnosis Date    Anxiety     Asthma     no meds, no inhalers    Chronic back pain     Cough     dry    Depression     Endometriosis     stage 4    Headache     Lazy eye     bilateral    Migraines     Osteoarthritis     tennis elbow and bursitis    Ovarian cyst     Seasonal allergies     laundry detergent, bar soap          Past Surgical History:   Procedure Laterality Date    BACK SURGERY      had tailbone surgery -removed several cysts from\"[place]\"    CYST REMOVAL      tailbone and right Current Every Day Smoker     Packs/day: 1.00     Years: 32.00     Pack years: 32.00     Types: Cigarettes    Smokeless tobacco: Never Used   Substance Use Topics    Alcohol use:  Yes     Alcohol/week: 0.0 standard drinks     Comment: rare    Drug use: No         MEDICATIONS:  Current Outpatient Medications   Medication Sig Dispense Refill    folic acid (FOLVITE) 1 MG tablet take 1 tablet by mouth every 6 hours 120 tablet 1    Prenatal Vit-Fe Fumarate-FA (RA PRENATAL FORMULA) 28-0.8 MG TABS take 1 tablet by mouth once daily 30 tablet 11    ondansetron (ZOFRAN) 4 MG tablet Take 1 tablet by mouth 3 times daily as needed for Nausea or Vomiting 15 tablet 0    propranolol (INDERAL LA) 120 MG extended release capsule take 1 capsule by mouth once daily      SUMAtriptan (IMITREX) 100 MG tablet       albuterol sulfate HFA (PROVENTIL HFA) 108 (90 Base) MCG/ACT inhaler Inhale 2 puffs into the lungs every 6 hours as needed for Wheezing 1 Inhaler 0    benzonatate (TESSALON PERLES) 100 MG capsule Take 1 capsule by mouth 3 times daily as needed for Cough (Patient not taking: Reported on 2/22/2021) 15 capsule 0    promethazine (PHENERGAN) 25 MG tablet Take 25 mg by mouth every 6 hours as needed       ibuprofen (ADVIL;MOTRIN) 600 MG tablet Take 1 tablet by mouth every 6 hours as needed for Pain 30 tablet 0    DICLOFENAC PO Take 25 mg by mouth daily Takes PRN only for headaches      tiZANidine (ZANAFLEX) 4 MG tablet Take 1 tablet by mouth every 12 hours as needed (for muscle spasm) 60 tablet 0    fluticasone (FLONASE) 50 MCG/ACT nasal spray 1 spray by Nasal route 2 times daily      loratadine (CLARITIN) 10 MG tablet Take 10 mg by mouth daily      meclizine (ANTIVERT) 12.5 MG tablet Take 12.5 mg by mouth 3 times daily as needed      butalbital-acetaminophen-caffeine (FIORICET, ESGIC) -40 MG per tablet Take 1 tablet by mouth every 4 hours as needed for Headaches       Current Facility-Administered Medications Medication Dose Route Frequency Provider Last Rate Last Admin    leuprolide (LUPRON) injection 3.75 mg  3.75 mg Intramuscular Once Lenwood Dubin, APRN - [facility]         Facility-Administered Medications Ordered in Other Visits   Medication Dose Route Frequency Provider Last Rate Last Admin    lactated ringers infusion   Intravenous Continuous Kelin Bush MD        sodium chloride flush 0.9 % injection 10 mL  10 mL Intravenous 2 times per day Kelin Bush MD        sodium chloride flush 0.9 % injection 10 mL  10 mL Intravenous PRN Bifrance Pak MD        ceFAZolin (ANCEF) 1 g IVPB 50mL minibag D5W  1 g Intravenous Once Alyssia Paulino MD             ALLERGIES:  Allergies as of 05/11/2021 - Review Complete 05/11/2021   Allergen Reaction Noted    Vicodin [hydrocodone-acetaminophen] Shortness Of Breath 03/04/2015    Hydrocodone  05/01/2015    Tape [adhesive tape]  05/08/2018    Bactrim [sulfamethoxazole-trimethoprim] Nausea And Vomiting 06/13/2016         Blood pressure 118/68, pulse 97, height 5' 7\" (1.702 m), weight 149 lb (67.6 kg), last menstrual period 03/20/2021, not currently breastfeeding. Abdomen: Soft non-tender; good bowel sounds. No guarding, rebound or rigidity. No CVA tenderness bilaterally. Extremities: No calf tenderness, DTR 2/4, and No edema bilaterally    Pelvic: Declined         Diagnostics:  No results found.       Lab Results:  8/2/2017  2:31 PM - Lela Orozco Incoming Lab Results From [facility]    Component Collected Lab   Cytology Report 07/31/2017  9:39 AM - [facility]   (NOTE)   [id number]   [facility] [address] [place]. [place], [address]   [phone]   Fax: [phone]   GYNECOLOGIC CYTOLOGY REPORT     Patient Name: Anita Robert   MR#: [id number]   Specimen #[id number]   Source:   1: Cervical material, (ThinPrep vial, Imaging-assisted review)     Clinical History   Z01.419 Routine gyn exam without abnormal findings   Co-Test:  ThinPrep Pap with high risk HPV testing   Z11.51 Encounter for screening for HPV   LMP:  7/16/17   INTERPRETATION     Cervical material, (ThinPrep vial, Imaging-assisted review):   Specimen Adequacy:       Satisfactory for evaluation.       - Endocervical/transformation zone component present. Descriptive Diagnosis:       Negative for intraepithelial lesion or malignancy. Comments:       High Risk HPV testing was ordered. Cytotechnologist:   OMER Garcia CD(ASCP)   **Electronically Signed Out**   kemal/8/2/2017           Procedure/Addendum   HPV Procedure Report     Date Ordered:     8/1/2017     Status: Signed   Out       Date Complete:     8/1/2017     By: OMER Tejeda(ASCP)       Date Reported:     8/2/2017       INTERPRETATION   Roche HPV DNA High Risk                                   HPV Sample               Thin Prep                    (Ref Range)   HPV Type 16               Not Detected                    (Not   Detected)   HPV Type 18               Not Detected                    (Not   Detected)   Other High Risk HPV     Not Detected                    (Not Detected)       This test amplifies and detects DNA of 14 high-risk HPV types   associated with cervical cancer and its precursor lesions (HPV types   16, 18, 31, 33, 35, 39, 45, 51, 52, 56, 58, 59, 66, and 68). Sensitivity may be affected by specimen collection methods, stage of   infection, and the presence of interfering substances.  Results should   be interpreted in conjunction with other available laboratory and   clinical data.  A negative high-risk HPV result does not exclude the   possibility of future cytologic HSIL or underlying CIN2-3 or cancer. This test is intended for medical purposes only and is not valid for   the evaluation of suspected sexual abuse or for other forensic   purposes.                Assessment:   Diagnosis Orders   1.  Pelvic pain in female  [facility] NON OB TRANSVAGINAL    Urinalysis Reflex to Culture   2. 5/17/18 Operative Laparoscopy w/ Fulgeration of Endometriosis (Stage 2), STERLING, and Chromopertibation     3. Endometriosis of peritoneum 9/2015 L-Scope Stage 3-4     4. Dysmenorrhea     5. Dyspareunia due to medical condition in female     6. Smoker       Chief Complaint   Patient presents with    Other     discuss [facility]         Patient Active Problem List    Diagnosis Date Noted    Endometriosis determined by laparoscopy Stage 2 and successful chromopertubation 5/17/2018 05/31/2018     Priority: High    Endometriosis of peritoneum 9/2015 L-Scope Stage 3-4 10/31/2017     Priority: High    Pelvic pain in female 05/19/2015     Priority: High    5/17/18 Operative Laparoscopy w/ Fulgeration of Endometriosis (Stage 2), STERLING, and Chromopertibation 05/17/2018     Priority: Medium    Acute back pain with sciatica, left     Chronic knee pain 08/16/2017    Chronic right shoulder pain 03/09/2017    Breast discharge 02/03/2017    Olecranon bursitis of right elbow 09/20/2016    Sebaceous cyst 08/26/2016    Ganglion cyst 08/18/2016    Wrist pain, chronic 08/12/2016    Chronic bilateral low back pain with bilateral sciatica 08/12/2016    Arthralgia of both elbows 06/13/2016    Arthritis 06/13/2016       PLAN:  Return in about 4 weeks (around 6/8/2021) for Surgical Boarding. ALAINATITO LEGGETT FOI STERLING +/-Enterocele  Smoking cessation reviewed  Return to the office in 2-4 weeks. Counseled on preventative health maintenance follow-up.   Orders Placed This Encounter   Procedures    US PELVIS COMPLETE     Standing Status:   Future     Standing Expiration Date:   8/11/2021     Order Specific Question:   Reason for exam:     Answer:   pelvic pain    US NON OB TRANSVAGINAL     Standing Status:   Future     Standing Expiration Date:   11/11/2021     Order Specific Question:   Reason for exam:     Answer:   pelvic pain    Urinalysis Reflex to Culture     Standing Status:   Future     Standing Expiration Date:   7/11/2021     Order Specific Question:   SPECIFY(EX-CATH,MIDSTREAM,CYSTO,ETC)? Answer:   MID       The uterus was not immobile. The patient  does not have a Narrow Pubic Arch. Her uterus is found to be undescended. The uterine size is 6-8 cm and regular. I counseled the patient on ALL routes to complete her surgery: Vaginal, Laparoscopic Assisted, Robotic Assisted, and Traditional open technique. The morbidity, procedural costs, recuperative times, infection rates, blood loss, duration of hospital stay and complication rates were discussed with each route. The patient has elected to proceed with Open with BLS. The patient, Samanta Quiñones is a 47 y.o. female, was seen with a total time spent of 20 minutes for the visit on this date of service by the E/M provider. The time component had both face to face and non face to face time spent in determining the total time component. Counseling and education regarding her diagnosis listed below and her options regarding those diagnoses were also included in determining her time component. Diagnosis Orders   1. Pelvic pain in female  900 [facility] NON OB TRANSVAGINAL    Urinalysis Reflex to Culture   2. 5/17/18 Operative Laparoscopy w/ Fulgeration of Endometriosis (Stage 2), STERLING, and Chromopertibation     3. Endometriosis of peritoneum 9/2015 L-Scope Stage 3-4     4. Dysmenorrhea     5. Dyspareunia due to medical condition in female     6. Smoker          The patient had her preventative health maintenance recommendations and follow-up reviewed with her at the completion of her visit.

## 2021-05-18 ENCOUNTER — OFFICE VISIT (OUTPATIENT)
Dept: OBGYN CLINIC | Age: 41
End: 2021-05-18
Payer: COMMERCIAL

## 2021-05-18 DIAGNOSIS — R10.2 PELVIC PAIN IN FEMALE: ICD-10-CM

## 2021-05-18 PROCEDURE — 76856 US EXAM PELVIC COMPLETE: CPT | Performed by: OBSTETRICS & GYNECOLOGY

## 2021-05-18 PROCEDURE — 76830 TRANSVAGINAL US NON-OB: CPT | Performed by: OBSTETRICS & GYNECOLOGY

## 2021-06-09 DIAGNOSIS — Z30.09 FAMILY PLANNING: ICD-10-CM

## 2021-06-09 RX ORDER — FOLIC ACID 1 MG/1
1 TABLET ORAL EVERY 6 HOURS
Qty: 120 TABLET | Refills: 3 | Status: SHIPPED | OUTPATIENT
Start: 2021-06-09 | End: 2021-10-12

## 2021-10-12 DIAGNOSIS — Z30.09 FAMILY PLANNING: ICD-10-CM

## 2021-10-12 RX ORDER — FOLIC ACID 1 MG/1
1 TABLET ORAL EVERY 6 HOURS
Qty: 120 TABLET | Refills: 3 | Status: SHIPPED | OUTPATIENT
Start: 2021-10-12 | End: 2022-02-09

## 2021-10-19 ENCOUNTER — HOSPITAL ENCOUNTER (OUTPATIENT)
Age: 41
Discharge: HOME | End: 2021-10-21
Payer: COMMERCIAL

## 2021-10-19 ENCOUNTER — HOSPITAL ENCOUNTER (OUTPATIENT)
Dept: GENERAL RADIOLOGY | Age: 41
Discharge: HOME | End: 2021-10-21
Payer: COMMERCIAL

## 2021-10-19 DIAGNOSIS — R05.9 COUGH: ICD-10-CM

## 2021-10-19 PROCEDURE — 71046 X-RAY EXAM CHEST 2 VIEWS: CPT

## 2021-10-26 ENCOUNTER — TELEPHONE (OUTPATIENT)
Dept: OBGYN CLINIC | Age: 41
End: 2021-10-26

## 2021-10-26 NOTE — TELEPHONE ENCOUNTER
Patient in office today she saw hwe==er PCP and is currently taking prednisone and wants to cancel her upcoming surgery for 11/15/51 and addy. Sometime in January 2022.

## 2021-12-17 ENCOUNTER — TELEPHONE (OUTPATIENT)
Dept: OBGYN CLINIC | Age: 41
End: 2021-12-17

## 2022-02-09 DIAGNOSIS — Z30.09 FAMILY PLANNING: ICD-10-CM

## 2022-02-09 RX ORDER — FOLIC ACID 1 MG/1
1 TABLET ORAL EVERY 6 HOURS
Qty: 120 TABLET | Refills: 3 | Status: SHIPPED | OUTPATIENT
Start: 2022-02-09 | End: 2022-06-16

## 2022-03-07 RX ORDER — PRENATAL WITH FERROUS FUM AND FOLIC ACID 3080; 920; 120; 400; 22; 1.84; 3; 20; 10; 1; 12; 200; 27; 25; 2 [IU]/1; [IU]/1; MG/1; [IU]/1; MG/1; MG/1; MG/1; MG/1; MG/1; MG/1; UG/1; MG/1; MG/1; MG/1; MG/1
TABLET ORAL
Qty: 30 TABLET | OUTPATIENT
Start: 2022-03-07

## 2022-03-14 DIAGNOSIS — Z30.09 FAMILY PLANNING: ICD-10-CM

## 2022-03-14 RX ORDER — ACETAMINOPHEN, DEXTROMETHORPHAN HBR, DOXYLAMINE SUCCINATE 650; 30; 12.5 MG/30ML; MG/30ML; MG/30ML
SOLUTION ORAL
Qty: 30 TABLET | Refills: 11 | Status: SHIPPED | OUTPATIENT
Start: 2022-03-14

## 2022-03-18 ENCOUNTER — OFFICE VISIT (OUTPATIENT)
Dept: OBGYN CLINIC | Age: 42
End: 2022-03-18
Payer: COMMERCIAL

## 2022-03-18 ENCOUNTER — HOSPITAL ENCOUNTER (OUTPATIENT)
Age: 42
Setting detail: SPECIMEN
Discharge: HOME | End: 2022-03-18

## 2022-03-18 VITALS
WEIGHT: 150 LBS | SYSTOLIC BLOOD PRESSURE: 122 MMHG | DIASTOLIC BLOOD PRESSURE: 74 MMHG | BODY MASS INDEX: 23.54 KG/M2 | HEIGHT: 67 IN

## 2022-03-18 DIAGNOSIS — Z01.419 WELL FEMALE EXAM WITH ROUTINE GYNECOLOGICAL EXAM: Primary | ICD-10-CM

## 2022-03-18 DIAGNOSIS — R39.9 UTI SYMPTOMS: ICD-10-CM

## 2022-03-18 DIAGNOSIS — Z12.31 ENCOUNTER FOR SCREENING MAMMOGRAM FOR MALIGNANT NEOPLASM OF BREAST: ICD-10-CM

## 2022-03-18 DIAGNOSIS — Z11.51 SPECIAL SCREENING EXAMINATION FOR HUMAN PAPILLOMAVIRUS (HPV): ICD-10-CM

## 2022-03-18 LAB
BILIRUBIN URINE: NEGATIVE
COLOR: ABNORMAL
COMMENT UA: ABNORMAL
GLUCOSE URINE: NEGATIVE
KETONES, URINE: ABNORMAL
LEUKOCYTE ESTERASE, URINE: NEGATIVE
NITRITE, URINE: NEGATIVE
PH UA: 6 (ref 5–8)
PROTEIN UA: NEGATIVE
SPECIFIC GRAVITY UA: 1.03 (ref 1–1.03)
TURBIDITY: CLEAR
URINE HGB: NEGATIVE
UROBILINOGEN, URINE: NORMAL

## 2022-03-18 PROCEDURE — G8484 FLU IMMUNIZE NO ADMIN: HCPCS | Performed by: NURSE PRACTITIONER

## 2022-03-18 PROCEDURE — 99395 PREV VISIT EST AGE 18-39: CPT | Performed by: NURSE PRACTITIONER

## 2022-03-18 RX ORDER — CIPROFLOXACIN 500 MG/1
500 TABLET, FILM COATED ORAL 2 TIMES DAILY
Qty: 14 TABLET | Refills: 0 | Status: SHIPPED | OUTPATIENT
Start: 2022-03-18 | End: 2022-03-25

## 2022-03-18 ASSESSMENT — PATIENT HEALTH QUESTIONNAIRE - PHQ9
SUM OF ALL RESPONSES TO PHQ QUESTIONS 1-9: 0
SUM OF ALL RESPONSES TO PHQ9 QUESTIONS 1 & 2: 0
1. LITTLE INTEREST OR PLEASURE IN DOING THINGS: 0
SUM OF ALL RESPONSES TO PHQ QUESTIONS 1-9: 0
2. FEELING DOWN, DEPRESSED OR HOPELESS: 0

## 2022-03-18 NOTE — PROGRESS NOTES
History and Physical  [address], [address] [facility] [address], [address] [facility] [phone]   Fax [phone]  Lynne Durand  3/18/2022              46 y.o. Chief Complaint   Patient presents with    Annual Exam       Patient's last menstrual period was 2022. Primary Care Physician: YESSENIA Pitt - CNP    The patient was seen and examined. She has no chief complaint today and is here for her annual exam.  Her bowels are regular. There are no voiding complaints. She denies any bloating. She denies vaginal discharge and was counseled on STD's and the need for barrier contraception. HPI : Lynne Durand is a 46 y.o. female     Annual exam  Complaining of lower back pain/ kidney pain and lower cramping. Has these symptoms when she has UTI. Desires treatment while UA results are pending over weekend.     ________________________________________________________________________  OB History    Para Term  AB Living   1 1 1 0 0 1   SAB IAB Ectopic Molar Multiple Live Births   0 0 0 0 0 1      # Outcome Date GA Lbr Jaspreet/2nd Weight Sex Delivery Anes PTL Lv   1 Term 05/10/08   7 lb 1 oz (3.204 kg) M Vag-Spont   GENIA      Name: Dave Rene     Past Medical History:   Diagnosis Date    Anxiety     Asthma     no meds, no inhalers    Chronic back pain     Cough     dry    Depression     Endometriosis     stage 4    Headache     Lazy eye     bilateral    Migraines     Osteoarthritis     tennis elbow and bursitis    Ovarian cyst     Seasonal allergies     laundry detergent, bar soap                                                                    Past Surgical History:   [facility]   [facility]    had tailbone surgery -removed several cysts from\"[place]\"    CYST REMOVAL      tailbone and right upper leg    DILATION AND CURETTAGE OF UTERUS      EXCISION LESION HAND / FINGER Right 4/9/2019    HAND LESION BIOPSY EXCISION performed by Vickie Caldera DO at [address]  9/24/15    operative,hysteroscopy, D&C    LAPAROSCOPY      NM EXCISION TUMOR SOFT TISSUE BACK/FLANK SUBQ <3CM N/A 12/19/2017    UPPER BACK AND RIGHT GROIN SEBACEOUS CYST EXCISION performed by Vickie Caldera DO at [address] SOFT TISSUE BACK/FLANK SUBQ <3CM Left 7/29/2019    EXCISION CYST UPPER BACK performed by Vickie Caldera DO at [address] N/A 5/17/2018    OPERATIVE LAPAROSCOPY W/FULGURATION OF ENDOMETRIOSIS STAGE 2  W/CHROMOPERTIBATION performed by Cheikh Novak DO at [address]       Family History   Problem Relation Age of Onset    Cancer Maternal Grandmother     Lung Cancer Maternal Grandmother     Diabetes Maternal Grandmother     Breast Cancer Maternal Grandmother     Arthritis Maternal Grandmother     Heart Disease Maternal Grandmother     High Blood Pressure Maternal Grandmother     High Cholesterol Maternal Grandmother     Vision Loss Maternal Grandmother     Arthritis Mother     High Blood Pressure Mother     High Cholesterol Mother     Arthritis Father     High Blood Pressure Father     Stroke Father     Miscarriages / [place] Sister     Arthritis Maternal Grandfather     Asthma Maternal Grandfather     Cancer Maternal Grandfather     Heart Disease Maternal Grandfather     High Blood Pressure Maternal Grandfather     High Cholesterol Maternal Grandfather     Arthritis Paternal Grandmother     Cancer Paternal Grandmother     Heart Disease Paternal Grandmother     High Blood Pressure Paternal Grandmother     Arthritis Paternal Grandfather     Cancer Paternal Grandfather     Heart Disease Paternal Grandfather     High Blood Pressure Paternal Grandfather      Social History     Socioeconomic History    Marital status: Single     Spouse name: Not on file    Number of children: Not on file    Years of education: Not on file    Highest education level: Not on file   Occupational History    Not on file   Tobacco Use    Smoking status: Current Every Day Smoker     Packs/day: 1.00     Years: 32.00     Pack years: 32.00     Types: Cigarettes    Smokeless tobacco: Never Used   Vaping Use    Vaping Use: Never used   Substance and Sexual Activity    Alcohol use: Yes     Alcohol/week: 0.0 standard drinks     Comment: rare    Drug use: No    Sexual activity: Never     Partners: Male   Other Topics Concern    Not on file   Social History Narrative    Not on file     Social Determinants of Health     Financial Resource Strain:     Difficulty of Paying Living Expenses: Not on file   Food Insecurity:     Worried About Running Out of Food in the Last Year: Not on file    Henrry of Food in the Last Year: Not on file   Transportation Needs:     Lack of Transportation (Medical): Not on file    Lack of Transportation (Non-Medical):  Not on file   Physical Activity:     Days of Exercise per Week: Not on file    Minutes of Exercise per Session: Not on file   Stress:     Feeling of Stress : Not on file   Social Connections:     Frequency of Communication with Friends and Family: Not on file    Frequency of Social Gatherings with Friends and Family: Not on file    Attends [employer] Services: Not on file    Active Member of [address] [place] [address] [employer] or Organizations: Not on file    Attends Club or Organization Meetings: Not on file    Marital Status: Not on file   Intimate Partner Violence:     Fear of Current or Ex-Partner: Not on file    Emotionally Abused: Not on file    Physically Abused: Not on file    Sexually Abused: Not on file   Housing Stability:     Unable to Pay for Housing in the Last Year: Not on file    Number of [place] in the Last Year: Not on file    Unstable Housing in the Last Year: Not on file       MEDICATIONS:  Current Outpatient Medications   Medication Sig Dispense Refill    ciprofloxacin (CIPRO) 500 MG tablet Take 1 tablet by mouth 2 times daily for 7 days 14 tablet 0    Prenatal Vit-Fe Fumarate-FA (RA PRENATAL FORMULA) 28-0.8 MG TABS take 1 tablet by mouth once daily 30 tablet 11    folic acid (FOLVITE) 1 MG tablet take 1 tablet by mouth every 6 hours 120 tablet 3    doxycycline hyclate (VIBRAMYCIN) 100 MG capsule take 1 capsule by mouth twice a day for 10 days      gabapentin (NEURONTIN) 100 MG capsule take 1 capsule by mouth twice a day      predniSONE (DELTASONE) 20 MG tablet take 1 tablet by mouth twice a day for 5 days      ondansetron (ZOFRAN) 4 MG tablet Take 1 tablet by mouth 3 times daily as needed for Nausea or Vomiting 15 tablet 0    propranolol (INDERAL LA) 120 MG extended release capsule take 1 capsule by mouth once daily      SUMAtriptan (IMITREX) 100 MG tablet       albuterol sulfate HFA (PROVENTIL HFA) 108 (90 Base) MCG/ACT inhaler Inhale 2 puffs into the lungs every 6 hours as needed for Wheezing 1 Inhaler 0    benzonatate (TESSALON PERLES) 100 MG capsule Take 1 capsule by mouth 3 times daily as needed for Cough 15 capsule 0    promethazine (PHENERGAN) 25 MG tablet Take 25 mg by mouth every 6 hours as needed       ibuprofen (ADVIL;MOTRIN) 600 MG tablet Take 1 tablet by mouth every 6 hours as needed for Pain 30 tablet 0    DICLOFENAC PO Take 25 mg by mouth daily Takes PRN only for headaches      tiZANidine (ZANAFLEX) 4 MG tablet Take 1 tablet by mouth every 12 hours as needed (for muscle spasm) 60 tablet 0    fluticasone (FLONASE) 50 MCG/ACT nasal spray 1 spray by Nasal route 2 times daily      loratadine (CLARITIN) 10 MG tablet Take 10 mg by mouth daily      meclizine (ANTIVERT) 12.5 MG tablet Take 12.5 mg by mouth 3 times daily as needed      butalbital-acetaminophen-caffeine (FIORICET, ESGIC) -40 MG per tablet Take 1 tablet by mouth every 4 hours as needed for Headaches       Current Facility-Administered Medications   Medication Dose Route Frequency Provider Last Rate Last Admin    leuprolide (LUPRON) injection 3.75 mg  3.75 mg IntraMUSCular Once YESSENIA Parkinson - CNP         Facility-Administered Medications Ordered in Other Visits   Medication Dose Route Frequency Provider Last Rate Last Admin    lactated ringers infusion   IntraVENous Continuous Bi Isabel MD        sodium chloride flush 0.9 % injection 10 mL  10 mL IntraVENous 2 times per day Shannen Day MD        sodium chloride flush 0.9 % injection 10 mL  10 mL IntraVENous PRN Bi Isabel MD        ceFAZolin (ANCEF) 1 g IVPB 50mL minibag D5W  1 g IntraVENous Once Joce Mcintyre MD               ALLERGIES:  Allergies as of 03/18/2022 - Fully Reviewed 03/18/2022   Allergen Reaction Noted    Vicodin [hydrocodone-acetaminophen] Shortness Of Breath 03/04/2015    Hydrocodone  05/01/2015    Tape [adhesive tape]  05/08/2018    Bactrim [sulfamethoxazole-trimethoprim] Nausea And Vomiting 06/13/2016       Symptoms of decreased mood absent  Symptoms of anhedonia absent    **If either question is answered in a  positive fashion then complete the PHQ9 Scoring Evaluation and make the appropriate referral**      Immunization status: stated as current, but no records available. Gynecologic History:  Menarche: 7 yo  Menopause at [address] [place] yo     Patient's last menstrual period was 02/26/2022. Sexually Active: No    STD History: No     Permanent Sterilization: No   Reversible Birth Control: No        Hormone Replacement Exposure: No      Genetic Qualified Family History of Breast, Ovarian , Colon or Uterine Cancer: No (maternal grandmother with breast cancer- > 46s)     If YES see scanned worksheet.     Preventative Health Testing:    Health Maintenance:  Health Maintenance Due   Topic Date Due    Hepatitis C screen  Never done    Varicella vaccine (1 of 2 - 2-dose childhood series) Never done    COVID-19 Vaccine (1) Never done    Depression Screen  Never done    HIV screen Never done    Flu vaccine (1) 09/01/2021       Date of Last Pap Smear: 7/31/2017 neg/neg  Abnormal Pap Smear History: denies  Colposcopy History:   Date of Last Mammogram: 9/15/2017 negative  Date of Last Colonoscopy:   Date of Last Bone Density:      ________________________________________________________________________        REVIEW OF SYSTEMS:    yes   A minimum of an eleven point review of systems was completed. Review Of Systems (11 point):  Constitutional: No fever, chills or malaise; No weight change or fatigue  Head and Eyes: No vision changes, Headache, Dizziness or trauma in last 12 months  ENT ROS: No hearing, Tinnitis, sinus or taste problems  Hematological and Lymphatic ROS:No Lymphoma, Von Willebrand's, Hemophillia or Bleeding History  Psych ROS: No Depression, Homicidal thoughts,suicidal thoughts, or anxiety  Breast ROS: No breast abnormalities or lumps  Respiratory ROS: No SOB, Pneumoniae,Cough, or Pulmonary Embolism   Cardiovascular ROS: No Chest Pain with Exertion, Palpitations, Syncope, Edema, Arrhythmia  Gastrointestinal ROS: No Indigestion, Heartburn, Nausea, vomiting, Diarrhea, Constipation,or Bowel Changes; No Bloody Stools or melena  Genito-Urinary ROS: No Dysuria, Hematuria or Nocturia.  No Urinary Incontinence or Vaginal Discharge. + hx endometriosis  Musculoskeletal ROS: No Arthralgia, Arthritis,Gout,Osteoporosis or Rheumatism  Neurological ROS: No CVA, Migraines, Epilepsy, Seizure Hx, or Limb Weakness  Dermatological ROS: No Rash, Itching, Hives, Mole Changes or Cancer                                                                                                                                                                                                                                  PHYSICAL Exam:     Constitutional:  Vitals:    03/18/22 1059   BP: 122/74   Site: Right Upper Arm   Position: Sitting   Cuff Size: Medium Adult   Weight: 150 lb (68 kg)   Height: 5' 7\" (1.702 m) Chaperone for Intimate Exam   Chaperone was offered and accepted as part of the rooming process.  Chaperone: Priscila          General Appearance: This  is a well Developed, well Nourished, well groomed female. Her BMI was reviewed. Nutritional decision making was discussed. Skin:  There was a Normal Inspection of the skin without rashes or lesions. There were no rashes. (Papular, Maculopapular, Hives, Pustular, Macular)     There were no lesions (Ulcers, Erythema, Abn. Appearing Nevi)            Lymphatic:  No Lymph Nodes were Palpable in the neck , axilla or groin.  0 # Of Lymph Nodes; Location ; Character [Normal]  [Shotty] [Tender] [Enlarged]     Neck and EENT:  The neck was supple. There were no masses   The thyroid was not enlarged and had no masses. Perrla, EOMI B/L, TMI B/L No Abnormalities. Throat inspected-No exudates or Masses, Nares Patent No Masses        Respiratory: The lungs were auscultated and found to be clear. There were no rales, rhonchi or wheezes. There was a good respiratory effort. Cardiovascular: The heart was in a regular rate and rhythm. . No S3 or S4. There was no murmur appreciated. Location, grade, and radiation are not applicable. Extremities: The patients extremities were without calf tenderness, edema, or varicosities. There was full range of motion in all four extremities. Pulses in all four extremities were appreciated and are 2/4. Abdomen: The abdomen was soft and non-tender. There were good bowel sounds in all quadrants and there was no guarding, rebound or rigidity. On evaluation there was no evidence of hepatosplenomegaly and there was no costal vertebral traci tenderness bilaterally. No hernias were appreciated. Abdominal Scars: C/W previous surgery    Psych:   The patient had a normal Orientation to: Time, Place, Person, and Situation  There is no Mood / Affect changes    Breast:  (Chest)  normal appearance, no masses or tenderness, No nipple retraction or dimpling, No nipple discharge or bleeding, No axillary or supraclavicular adenopathy, Normal to palpation without dominant masses  Self breast exams were reviewed in detail. Literature was given. Pelvic Exam:  External genitalia: normal general appearance  Urinary system: urethral meatus normal  Vaginal: normal mucosa without prolapse or lesions  Cervix: normal appearance  Adnexa: normal bimanual exam  Uterus: normal single, nontender    Rectal Exam:  exam declined by patient          Musculosk:  Normal Gait and station was noted. Digits were evaluated without abnormal findings. Range of motion, stability and strength were evaluated and found to be appropriate for the patients age. ASSESSMENT:      46 y.o. Annual   Diagnosis Orders   1. Encounter for screening mammogram for malignant neoplasm of breast  ELIZABETH DIGITAL SCREEN W OR WO CAD BILATERAL   2. Well female exam with routine gynecological exam  PAP SMEAR   3. Special screening examination for human papillomavirus (HPV)  PAP SMEAR   4.  UTI symptoms  Urinalysis with Reflex to Culture    ciprofloxacin (CIPRO) 500 MG tablet          Chief Complaint   Patient presents with    Annual Exam          Past Medical History:   Diagnosis Date    Anxiety     Asthma     no meds, no inhalers    Chronic back pain     Cough     dry    Depression     Endometriosis     stage 4    Headache     Lazy eye     bilateral    Migraines     Osteoarthritis     tennis elbow and bursitis    Ovarian cyst     Seasonal allergies     laundry detergent, bar soap          Patient Active Problem List    Diagnosis Date Noted    Endometriosis determined by laparoscopy Stage 2 and successful chromopertubation 5/17/2018 05/31/2018     Priority: High    Endometriosis of peritoneum 9/2015 L-Scope Stage 3-4 10/31/2017     Priority: Medium    5/17/18 Operative Laparoscopy w/ Fulgeration of Endometriosis (Stage 2), STERLING, and Chromopertibation 05/17/2018    Acute back pain with sciatica, left     Chronic knee pain 08/16/2017    Chronic right shoulder pain 03/09/2017    Breast discharge 02/03/2017    Olecranon bursitis of right elbow 09/20/2016    Sebaceous cyst 08/26/2016    Ganglion cyst 08/18/2016    Wrist pain, chronic 08/12/2016    Chronic bilateral low back pain with bilateral sciatica 08/12/2016    Arthralgia of both elbows 06/13/2016    Arthritis 06/13/2016    Pelvic pain in female 05/19/2015          Hereditary Breast, Ovarian, Colon and Uterine Cancer screening Done. Tobacco & Secondary smoke risks reviewed; instructed on cessation and avoidance      Counseling Completed:  Preventative Health Recommendations and Follow up. The patient was informed of the recommended preventative health recommendations. 1. Annuals every year; Cytology collections per prevailing guidelines. 2. Mammograms begin every year at 37 yo if no abnormalities are found and no family history. 3. Bone density studies every 2-3 years. Begin at 71 yo. If no fracture history or osteoporosis family history. (significant). 4. Colonoscopy begin at 40 yo. Repeat every ten years if negative and no family history. 5. Calcium of 7737-2857 mg/day in split dosing  6. Vitamin D 400-800 IU/day  7. All other preventative health recommendations will be managed by the patients Primary care physician. PLAN:  Return in about 1 year (around 3/18/2023) for annual.   Pap smear obtained. Mammogram ordered. UA C&S obtained. Script for cipro while UA C&S pending. Repeat Annual every 1 year  Cervical Cytology Evaluation begins at 24years old. If Negative Cytology, Follow-up screening per current guidelines. Mammograms every 1 year. If 37 yo and last mammogram was negative. Calcium and Vitamin D dosing reviewed. Colonoscopy screening reviewed as well as onset for bone density testing. Birth control and barrier recommendations discussed.   STD counseling and prevention reviewed. Gardisil counseling completed for all patients 10-35 yo. Routine health maintenance per patients PCP. Orders Placed This Encounter   Procedures    ELIZABETH DIGITAL SCREEN W OR WO CAD BILATERAL     Standing Status:   Future     Standing Expiration Date:   5/17/2023     Order Specific Question:   Reason for exam:     Answer:   screening mammogram    PAP SMEAR     Patient History:    No LMP recorded. (Menstrual status: Irregular periods). OBGYN Status: Irregular periods  Past Surgical History:  1996: BACK SURGERY      Comment:  had tailbone surgery -removed several cysts                from\"[place]\"  1996: CYST REMOVAL      Comment:  tailbone and right upper leg  No date: DILATION AND CURETTAGE OF UTERUS  4/9/2019: EXCISION LESION HAND / FINGER; Right      Comment:  HAND LESION BIOPSY EXCISION performed by Massiel Corona DO at [address]  9/24/15: LAPAROSCOPY      Comment:  operative,hysteroscopy, D&C  No date: LAPAROSCOPY  12/19/2017: CA EXCISION TUMOR SOFT TISSUE BACK/FLANK SUBQ <3CM; N/A      Comment:  UPPER BACK AND RIGHT GROIN SEBACEOUS CYST EXCISION                performed by Massiel Corona DO at [address]  7/29/2019: CA EXCISION TUMOR SOFT TISSUE BACK/FLANK SUBQ <3CM; Left      Comment:  EXCISION CYST UPPER BACK performed by Massiel Corona DO                at [address]  5/17/2018: CA LAP,DIAGNOSTIC ABDOMEN; N/A      Comment:  OPERATIVE LAPAROSCOPY W/FULGURATION OF ENDOMETRIOSIS                STAGE 2  W/CHROMOPERTIBATION performed by Cristal Rodas DO at [address]  No date: SPINE SURGERY      Social History    Tobacco Use      Smoking status: Current Every Day Smoker        Packs/day: 1.00        Years: 32.00        Pack years: 32        Types: Cigarettes      Smokeless tobacco: Never Used       Standing Status:   Future     Standing Expiration Date:   3/17/2023     Order Specific Question:   Collection Type     Answer:    Thin Prep     Order Specific Question: Prior Abnormal Pap Test     Answer:   No     Order Specific Question:   Screening or Diagnostic     Answer:   Screening     Order Specific Question:   HPV Requested? Answer:   Yes     Order Specific Question:   High Risk Patient     Answer:   N/A    Urinalysis with Reflex to Culture     Standing Status:   Future     Standing Expiration Date:   3/18/2023     Order Specific Question:   SPECIFY(EX-CATH,MIDSTREAM,CYSTO,ETC)? Answer:   midstream           The patient, Radha Negron is a 46 y.o. female, was seen with a total time spent of 20 minutes for the visit on this date of service by the E/M provider. The time component had both face to face and non face to face time spent in determining the total time component. Counseling and education regarding her diagnosis listed below and her options regarding those diagnoses were also included in determining her time component. Diagnosis Orders   1. Encounter for screening mammogram for malignant neoplasm of breast  ELIZABETH DIGITAL SCREEN W OR WO CAD BILATERAL   2. Well female exam with routine gynecological exam  PAP SMEAR   3. Special screening examination for human papillomavirus (HPV)  PAP SMEAR   4. UTI symptoms  Urinalysis with Reflex to Culture    ciprofloxacin (CIPRO) 500 MG tablet        The patient had her preventative health maintenance recommendations and follow-up reviewed with her at the completion of her visit.

## 2022-03-22 LAB
HPV SAMPLE: NORMAL
HPV, GENOTYPE 16: NOT DETECTED
HPV, GENOTYPE 18: NOT DETECTED
HPV, HIGH RISK OTHER: NOT DETECTED
HPV, INTERPRETATION: NORMAL
SPECIMEN DESCRIPTION: NORMAL

## 2022-03-24 LAB — CYTOLOGY REPORT: NORMAL

## 2022-04-07 ENCOUNTER — TELEPHONE (OUTPATIENT)
Dept: OBGYN CLINIC | Age: 42
End: 2022-04-07

## 2022-04-07 ENCOUNTER — HOSPITAL ENCOUNTER (OUTPATIENT)
Dept: WOMENS IMAGING | Age: 42
Discharge: HOME | End: 2022-04-09
Payer: COMMERCIAL

## 2022-04-07 DIAGNOSIS — R92.2 DENSE BREAST TISSUE ON MAMMOGRAM: ICD-10-CM

## 2022-04-07 DIAGNOSIS — N63.20 BREAST MASS, LEFT: Primary | ICD-10-CM

## 2022-04-07 DIAGNOSIS — Z12.39 ENCOUNTER FOR BREAST CANCER SCREENING USING NON-MAMMOGRAM MODALITY: ICD-10-CM

## 2022-04-07 DIAGNOSIS — Z12.31 ENCOUNTER FOR SCREENING MAMMOGRAM FOR MALIGNANT NEOPLASM OF BREAST: ICD-10-CM

## 2022-04-07 DIAGNOSIS — Z80.3 FAMILY HISTORY OF BREAST CANCER IN FEMALE: ICD-10-CM

## 2022-04-07 PROCEDURE — 77063 BREAST TOMOSYNTHESIS BI: CPT

## 2022-04-07 NOTE — TELEPHONE ENCOUNTER
Per Radha Cooney pt notified of mammogram results showing Dense tissue- may decrease the sensitivity of mammograms. Elevated lifetime risk of 20%  Refer to high risk breast clinic sent. Alternating MRI and mammogram every 6 months/order in epic. Left breast with small mass at 11-12 o clock  Diagnostics mammogram and u/s order in epic.

## 2022-04-07 NOTE — TELEPHONE ENCOUNTER
----- Message from YESSENIA Yoo - NP sent at 4/7/2022  2:26 PM EDT -----  Dense tissue- may decrease the sensitivity of mammograms  Elevated lifetime risk of 20%  Refer to high risk breast clinic   Alternating MRI and mammogram every 6 months  Left breast with small mass at 11-12 o clock  Diagnostics mammogram and u/s need to be ordered

## 2022-04-18 ENCOUNTER — HOSPITAL ENCOUNTER (OUTPATIENT)
Dept: WOMENS IMAGING | Age: 42
Discharge: HOME | End: 2022-04-20
Payer: COMMERCIAL

## 2022-04-18 ENCOUNTER — HOSPITAL ENCOUNTER (OUTPATIENT)
Dept: WOMENS IMAGING | Age: 42
End: 2022-04-18
Payer: COMMERCIAL

## 2022-04-18 DIAGNOSIS — Z12.39 ENCOUNTER FOR BREAST CANCER SCREENING USING NON-MAMMOGRAM MODALITY: ICD-10-CM

## 2022-04-18 DIAGNOSIS — N63.20 BREAST MASS, LEFT: ICD-10-CM

## 2022-04-18 DIAGNOSIS — R92.2 DENSE BREAST TISSUE ON MAMMOGRAM: ICD-10-CM

## 2022-04-18 DIAGNOSIS — Z80.3 FAMILY HISTORY OF BREAST CANCER IN FEMALE: ICD-10-CM

## 2022-04-18 PROCEDURE — 76642 ULTRASOUND BREAST LIMITED: CPT

## 2022-04-19 ENCOUNTER — TELEPHONE (OUTPATIENT)
Dept: OBGYN CLINIC | Age: 42
End: 2022-04-19

## 2022-04-19 DIAGNOSIS — Z80.3 FAMILY HISTORY OF BREAST CANCER: Primary | ICD-10-CM

## 2022-04-19 NOTE — TELEPHONE ENCOUNTER
----- Message from YESSENIA Perez NP sent at 4/18/2022 10:24 AM EDT -----  Mammogram neg  Increased lifetime risk of breast cancer is 20%  Recommended alternating mammogram/ MRI every 6 months  Please refer to Genetic Counseling     Small benign cyst in left breast

## 2022-06-16 DIAGNOSIS — Z30.09 FAMILY PLANNING: ICD-10-CM

## 2022-06-16 RX ORDER — FOLIC ACID 1 MG/1
1 TABLET ORAL EVERY 6 HOURS
Qty: 120 TABLET | Refills: 3 | Status: SHIPPED | OUTPATIENT
Start: 2022-06-16 | End: 2022-10-11

## 2022-10-10 ENCOUNTER — HOSPITAL ENCOUNTER (OUTPATIENT)
Age: 42
Discharge: HOME | End: 2022-10-12
Payer: COMMERCIAL

## 2022-10-10 ENCOUNTER — HOSPITAL ENCOUNTER (OUTPATIENT)
Dept: GENERAL RADIOLOGY | Age: 42
Discharge: HOME | End: 2022-10-12
Payer: COMMERCIAL

## 2022-10-10 DIAGNOSIS — M25.562 LEFT MEDIAL KNEE PAIN: ICD-10-CM

## 2022-10-10 PROCEDURE — 73562 X-RAY EXAM OF KNEE 3: CPT

## 2022-10-11 DIAGNOSIS — Z30.09 FAMILY PLANNING: ICD-10-CM

## 2022-10-11 RX ORDER — FOLIC ACID 1 MG/1
1 TABLET ORAL EVERY 6 HOURS
Qty: 120 TABLET | Refills: 3 | Status: SHIPPED | OUTPATIENT
Start: 2022-10-11

## 2022-12-08 ENCOUNTER — HOSPITAL ENCOUNTER (OUTPATIENT)
Dept: MRI IMAGING | Age: 42
Discharge: HOME | End: 2022-12-10
Payer: COMMERCIAL

## 2022-12-08 DIAGNOSIS — N63.20 BREAST MASS, LEFT: ICD-10-CM

## 2022-12-08 DIAGNOSIS — R92.2 DENSE BREAST TISSUE ON MAMMOGRAM: ICD-10-CM

## 2022-12-08 DIAGNOSIS — Z80.3 FAMILY HISTORY OF BREAST CANCER IN FEMALE: ICD-10-CM

## 2022-12-08 DIAGNOSIS — Z12.39 ENCOUNTER FOR BREAST CANCER SCREENING USING NON-MAMMOGRAM MODALITY: ICD-10-CM

## 2022-12-08 PROCEDURE — C8908 MRI W/O FOL W/CONT, BREAST,: HCPCS

## 2022-12-08 PROCEDURE — A9579 GAD-BASE MR CONTRAST NOS,1ML: HCPCS | Performed by: NURSE PRACTITIONER

## 2022-12-08 PROCEDURE — 6360000004 HC RX CONTRAST MEDICATION: Performed by: NURSE PRACTITIONER

## 2022-12-08 PROCEDURE — 2580000003 HC RX 258: Performed by: NURSE PRACTITIONER

## 2022-12-08 RX ORDER — 0.9 % SODIUM CHLORIDE 0.9 %
50 INTRAVENOUS SOLUTION INTRAVENOUS ONCE
Status: COMPLETED | OUTPATIENT
Start: 2022-12-08 | End: 2022-12-08

## 2022-12-08 RX ORDER — SODIUM CHLORIDE 0.9 % (FLUSH) 0.9 %
10 SYRINGE (ML) INJECTION PRN
Status: DISCONTINUED | OUTPATIENT
Start: 2022-12-08 | End: 2022-12-11 | Stop reason: HOSPADM

## 2022-12-08 RX ADMIN — SODIUM CHLORIDE, PRESERVATIVE FREE 10 ML: 5 INJECTION INTRAVENOUS at 13:39

## 2022-12-08 RX ADMIN — SODIUM CHLORIDE 50 ML: 9 INJECTION, SOLUTION INTRAVENOUS at 13:39

## 2022-12-08 RX ADMIN — GADOTERIDOL 15 ML: 279.3 INJECTION, SOLUTION INTRAVENOUS at 13:38

## 2022-12-09 ENCOUNTER — INITIAL CONSULT (OUTPATIENT)
Dept: ONCOLOGY | Age: 42
End: 2022-12-09

## 2022-12-09 VITALS
BODY MASS INDEX: 22.76 KG/M2 | HEART RATE: 65 BPM | TEMPERATURE: 97.8 F | DIASTOLIC BLOOD PRESSURE: 71 MMHG | HEIGHT: 67 IN | SYSTOLIC BLOOD PRESSURE: 137 MMHG | WEIGHT: 145 LBS

## 2022-12-09 DIAGNOSIS — Z80.41 FAMILY HISTORY OF OVARIAN CANCER: ICD-10-CM

## 2022-12-09 DIAGNOSIS — Z80.3 FAMILY HISTORY OF BREAST CANCER: ICD-10-CM

## 2022-12-09 DIAGNOSIS — Z80.41 FAMILY HISTORY OF OVARIAN CANCER: Primary | ICD-10-CM

## 2022-12-09 DIAGNOSIS — Z91.89 AT HIGH RISK FOR BREAST CANCER: ICD-10-CM

## 2022-12-09 DIAGNOSIS — Z91.89 AT HIGH RISK FOR BREAST CANCER: Primary | ICD-10-CM

## 2022-12-09 NOTE — PROGRESS NOTES
3 [facility] Program   Hereditary Cancer Risk Assessment     Name: Ángel Flores   YOB: 1980   Date of Consultation: 22     Ms. Mathew Dunlap was seen at the [facility] for genetic counseling on 22. She is also seen by medical oncologist Dr. Juan Hall. Ms. Mathew Dunlap was referred by YESSENIA Morgan* to discuss her risk factors for breast cancer. PERSONAL HISTORY   Ms. Mathew Dunlap is a 38 y.o.  female with no personal history of cancer. She reports:     Menarche at age: 10y  First child at age: 26y  Menopause at age: NA, premenopausal  Hysterectomy: NA  Oophorectomy: NA  Last mammogram: 2022 and follow up left US, BIRADS 2, heterogeneously dense   Last breast MRI: 22, BIRADS 1  Breast biopsy: NEVER     At her mammogram on 22, a lifetime risk for breast cancer was calculated. According to the [employer] risk model, Ms. Erazo's lifetime risk for breast cancer is approximately 20.0%. FAMILY HISTORY  Ms. Mathew Dunlap has one son (13y). She has two full brothers and one full sister. She has one maternal half brother. Ms. Tee Lamb mother is living at age 79, no cancer history. She has two maternal uncles, no known cancers. Her maternal grandmother had breast cancer at age 67. Ms. Tee Lamb father is living at age 71, no cancer history. She reports two maternal aunts with ovarian cancer and a third maternal aunt with gallbladder cancer at age 36. Her paternal grandmother  at age 64 from brain cancer, unknown primary. Ms. Mathew Dunlap reports unknown ancestry and denies any known Ashkenazi Yazidism heritage. RISK ASSESSMENT   We discussed that approximately 5-10% of cancers are due to a hereditary gene mutation which causes an increased risk for certain cancers.  Hereditary cancers are typically diagnosed at younger ages (under age 46y) and occur in multiple generations of a family. Multiple individuals with the same type of cancer (example: breast or colorectal) or uncommon cancers (example: ovarian, pancreatic, male breast cancer) are also features of hereditary cancers. In summary, Ms. Bernard Garcia meets the [address] [place] [address] (NCCN) guidelines for genetic testing based on her paternal family history of two second degree relatives with ovarian cancer. The NCCN guidelines also recognize that an individual's personal and/or family history may be explained by more than one inherited cancer syndrome. Thus, a multi-gene panel may be more efficient, more cost effecting, and increases the yield of detecting a hereditary mutation which would impact medical management. Given her personal and/or family history, we recommend testing for the following genes at minimum: RAD51C/D, BRIP1, Carmen syndrome genes. DISCUSSION  We discussed that the BRCA1/2 genes are the most common genes associated with hereditary breast and ovarian cancer. We also discussed that genetic testing is available for multiple other genes related to hereditary cancer. Some of these genes are known to carry a significant increased risk for several cancers including colon, breast, uterine, ovarian, stomach, and pancreatic cancer, while some of these genes are believed to have a moderate increased risk for breast and other cancers. We discussed the possibility of finding a mutation in genes with limited information to guide medical management, as well we as the possibility of identifying variants of uncertain significance (VUS). We discussed the risks, benefits, and limitations of genetic testing. Possible test results were discussed as well as potential screening and prevention strategies. Specifically, we discussed increased breast cancer surveillance by mammogram and breast MRI as well as the option of prophylactic mastectomy.  We discussed the recommendation for prophylactic

## 2022-12-12 ENCOUNTER — TELEPHONE (OUTPATIENT)
Dept: ONCOLOGY | Age: 42
End: 2022-12-12

## 2022-12-23 LAB
Lab: NEGATIVE
Lab: NORMAL

## 2022-12-26 NOTE — PROGRESS NOTES
BRIEF CASE HISTORY: Patient is a very pleasant 38 y.o. female who is referred to us for consultation for the high risk  breast cancer clinic. She is evaluated by myself and the genetic counselor. She does not have any personal history of breast cancer but she has significant family history. She underwent screening mammogram and during the screen, she took the [employer] high risk questionnaire. The questionnaire showed that her lifetime risk of breast cancer is 20.0%. which falls into the high risk category. She is referred to us for counseling, discussion of risk reduction modalities as well as genetic evaluation. GYN history   Menarche age 8  First child at age 24  Menopause not applicable, patient premenopausal.  Last mammogram 4/2022  Last MRI 12/8/2022 BI-RADS 1  Breast biopsy never    PAST MEDICAL HISTORY:  has a past medical history of Anxiety, Asthma, Chronic back pain, Cough, Depression, Endometriosis, Headache, Lazy eye, Migraines, Osteoarthritis, Ovarian cyst, and Seasonal allergies.       PAST SURGICAL HISTORY:   Past Surgical History:   Procedure Laterality Date    BACK SURGERY  1996    had tailbone surgery -removed several cysts from\"[place]\"    CYST REMOVAL  1996    tailbone and right upper leg    DILATION AND CURETTAGE OF UTERUS      EXCISION LESION HAND / FINGER Right 4/9/2019    HAND LESION BIOPSY EXCISION performed by Omi Cardoza DO at [facility]  9/24/15    operative,hysteroscopy, D&C    LAPAROSCOPY      MI EXCISION TUMOR SOFT TISSUE BACK/FLANK SUBQ <3CM N/A 12/19/2017    UPPER BACK AND RIGHT GROIN SEBACEOUS CYST EXCISION performed by Omi Cardoza DO at [address] [place] TISSUE BACK/FLANK SUBQ <3CM Left 7/29/2019    EXCISION CYST UPPER BACK performed by Omi Cardoza DO at [facility] N/A 5/17/2018    OPERATIVE LAPAROSCOPY W/FULGURATION OF ENDOMETRIOSIS STAGE 2  W/CHROMOPERTIBATION performed by Ashly Perez Serena Brown DO at [address]:  has a current medication list which includes the following prescription(s): folic acid, ra prenatal formula, ondansetron, sumatriptan, albuterol sulfate hfa, promethazine, ibuprofen, diclofenac, tizanidine, loratadine, meclizine, butalbital-acetaminophen-caffeine, gabapentin, and propranolol, and the following Facility-Administered Medications: leuprolide, lactated ringers, sodium chloride flush, sodium chloride flush, and ceFAZolin (ANCEF) 1 g IVPB 50mL minibag D5W. ALLERGIES:  has No Known Allergies. FAMILY HISTORY: detailed family history was obtained, and reviewed, it is detailed in the genetic counselor note. SOCIAL HISTORY:   reports that she has been smoking cigarettes. She has a 32.00 pack-year smoking history. She has never used smokeless tobacco. She reports current alcohol use. She reports that she does not use drugs. REVIEW OF SYSTEMS:   General: No fever or night sweats. Weight is stable. ENT: No double or blurred vision, no tinnitus or hearing problem, no dysphagia or sore throat   Respiratory: No chest pain, no shortness of breath, no cough or hemoptysis. Cardiovascular: Denies chest pain, PND or orthopnea. No L E swelling or palpitations. Gastrointestinal: No nausea or vomiting, abdominal pain, diarrhea or constipation. Genitourinary: Denies dysuria, hematuria, frequency, urgency or incontinence. Neurological: Denies headaches, decreased LOC, no sensory or motor focal deficits. Musculoskeletal: No arthralgia no back pain or joint swelling. PHYSICAL EXAM: Shows a well appearing 38 y.o. female who is not in pain or distress. Vital Signs: Blood pressure 137/71, pulse 65, temperature 97.8 °F (36.6 °C), temperature source Temporal, height 5' 7\" (1.702 m), weight 145 lb (65.8 kg), not currently breastfeeding. HEENT: Normocephalic and atraumatic.  Pupils are equal, round, reactive to light and accommodation. Extraocular muscles are intact. Neck: Showed no JVD, no carotid bruit . Lungs: Clear to auscultation bilaterally. Heart: Regular without any murmur. Abdomen: Soft, nontender. No hepatosplenomegaly. Extremities: Lower extremities show no edema, clubbing, or cyanosis. Breasts: Examination not done today. (Deferred)  Neuro exam: intact cranial nerves bilaterally no motor or sensory deficit, gait is normal. Lymphatic: no adenopathy appreciated in the supraclavicular, axillary, cervical or inguinal area    REVIEW OF LABORATORY DATA:     Results for orders placed or performed in visit on 12/09/22   [facility] (2+79)   Result Value Ref Range    Report Summary NEGATIVE     Footnotes See Notes          REVIEW OF RADIOLOGICAL RESULTS:   MRI BREAST BILATERAL W WO CONTRAST    Result Date: 12/8/2022  EXAMINATION: MRI OF THE BILATERAL BREASTS WITHOUT AND WITH CONTRAST 12/8/2022 12:55 pm: TECHNIQUE: Multiplanar multisequence MRI of the bilateral breasts were performed without and with the administration of intravenous contrast. Data analysis was performed with color parametric mapping, image subtraction, and 3D reconstructions. COMPARISON: Mammogram dated 04/07/2022. Ultrasound dated 04/18/2022. Mammogram and ultrasound dated 09/15/2017. HISTORY: ORDERING SYSTEM PROVIDED HISTORY:  Breast mass, left. TECHNOLOGIST PROVIDED HISTORY: STAT Creatinine as needed:  No dense breast tissue/ left breast mass/ family hx breast cancer in M-Grandmother. What is the sedation requirement? None TC score 20%. FINDINGS: Fibroglandular Tissue:  Heterogeneously dense. Background parenchymal enhancement: Marked. Right Breast:  No suspicious mass or abnormal enhancement. Left Breast:  No suspicious mass or abnormal enhancement. Lymph Nodes:  No axillary or internal mammary lymphadenopathy. Extra Mammary:  No additional findings. Mildly limited study due to marked background parenchymal enhancement.   No MR evidence of malignancy in the bilateral breasts. BI-RADS 1 BIRADS: BIRADS - CATEGORY 1 Negative MR. Normal interval follow-up screening breast MRI is recommended in 12 months. Bilateral screening mammogram will be due in April 2023. OVERALL ASSESSMENT - NEGATIVE       IMPRESSION:   Elevated lifetime risk of breast cancer based on the [address], life time risk is 20%  Family history of cancer  PLAN:   I had a very long discussion with the patient, explaining the [employer] model and the risk factor that led to hair falling into the high risk category. Lifetime risk of developing breast cancer in average [facility] woman ranges between 10% and 12%. So her lifetime risk is almost double the normal.   Various governing bodies in the oncology world have determined that lifetime risk of breast cancer above 20% constitutes a group of women who are eligible for enhanced surveillance and should be counseled about genetic breast cancer. Also guidelines determined that this group of women are eligible for breast cancer reduction modalities such as the use of tamoxifen based on the NSABP study of tamoxifen and raloxifene and breast cancer (STAR trial) her tamoxifen was able to decrease the discomfort risk cancer by 50%  Side effects of tamoxifen were explained in detail including hot flashes, possibility of elevated liver enzymes, the rare possibilities of deep venous thrombosis and the even more rare possibility of vaginal bleeding, endometrial hyperplasia or even endometrial carcinoma. After thorough discussion and careful explanation of the above, the patient chose not to pursue tamoxifen for chemoprevention  Recommend continuation of annual screening mammogram alternating with annual screening MRI breast.  We will see patient on as-needed basis.     Maude Miller MD          This note is created with the assistance of a speech recognition program.  While intending to generate a document that actually reflects the content of the visit, the document can still have some errors including those of syntax and sound a like substitutions which may escape proof reading. It such instances, actual meaning can be extrapolated by contextual diversion.

## 2022-12-29 ENCOUNTER — TELEPHONE (OUTPATIENT)
Dept: ONCOLOGY | Age: 42
End: 2022-12-29

## 2022-12-29 NOTE — TELEPHONE ENCOUNTER
Left message for Goldy Sotelo notifying her that her genetic test results are available. Requested that she return my phone call at her earliest convenience to review results.

## 2022-12-29 NOTE — TELEPHONE ENCOUNTER
3 [facility] Program   Hereditary Cancer Risk Assessment     Name: Ghulam Crespo  YOB: 1980  Date of Results Disclosure: 12/29/22      HISTORY   Ms. Mahsa Barney was seen for genetic counseling at the request of Eliza Singh DO due to her family history of cancer. At that time, Ms. Mahsa Barney chose to pursue genetic testing via the [employer] hereditary cancer gene panel. These results were discussed with Ms. Mahsa Barney via telephone. A summary of Ms. Erazo's results and recommendations are below. RESULTS  Mary [employer] Hereditary Cancer Panel: NEGATIVE - NO CLINICALLY SIGNIFICANT MUTATIONS DETECTED   This panel included the analysis of 81 genes associated with hereditary cancer including: AIP, ALK, APC, NORMAN, AXIN2, BAP1, BARD1, BMPR1A, BRCA1, BRCA2, BRIP1, CDC73, CDH1, CDK4, CDKN1B, CDKN1C, CDKN2A, CEBPA, CHEK2, CYLD, DDX41, DICER1, EGFR, EPCAM, EXT1, EXT2, FH, FLCN, GATA2, GREM1, HOXB13, KIT, LZTR1, MAX, MEN1, MET, MITF, MLH1, MSH2, MSH3, MSH6, MUTYH, NBN, NF1, NF2, NTHL1, PALB2, PDGFRA, PHOX2B, PMS2, POLD1, POLE, POT1, DCRZK4O, PTCH1, PTEN, RAD51C, RAD51D, RB1, RET, RHBDF2, RUNX1, SDHA, SDHAF2, SDHB, SDHC, SDHD, SMAD4, SMARCA4, SMARCB1, SMARCE1, STK11, SUFU, TERC , TERT, BEGC097, TP53, TSC1, TSC2, VHL, WT1. Please refer to genetic test report for technical details. We discussed that Ms. Erazo's negative test result greatly reduces the likelihood that she carries a hereditary gene mutation. However, it is possible that her family history of cancer is due to a hereditary mutation which she did not inherit. It is also possible that her family history of cancer may be due to a gene for which testing was not performed or which has yet to be discovered. RECOMMENDATIONS  1) While Ms. Mahsa Barney does not carry a known hereditary gene mutation, her risk for breast cancer may still be elevated. Based on Ms. Erazo's personal risk factors and family history of breast cancer, her estimated lifetime risk for breast cancer is 20.0% according to the [employer] risk model. The [employer] (NCCN) recommends that women with a lifetime risk of breast cancer 20% or higher consider the following screening and risk reducing options:     NCCN Recommendation Age to Begin Frequency    Breast awareness - Women should be familiar with their breasts and promptly report changes to their healthcare provider. Periodic, consistent breast self-examination (BSE) may be beneficial  Individualized  N/A    Clinical Breast Examination  Individualized Every 6-12 months   Breast MRI with contrast  38years old  Annual   Mammogram (consider tomosynthesis)  38years old  Annual    Consider risk reducing agents (i.e. Tamoxifen)  Individualized  N/A    *age to begin screening is based on the onset of breast cancer in Ms. Erazo's family    2) Ms. Elvira Ambriz should continue all other cancer screening guidelines as directed by her physicians. RECOMMENDATIONS FOR FAMILY MEMBERS   1) Genetic testing is not recommended for Ms. Drapers children based on her negative test results. However, this recommendation does not take into consideration any family history of cancer in their paternal family. 2) It is possible that the cancers in Ms. Drapers family are due to a hereditary gene mutation that she did not inherit. Therefore, her relatives (particularly those with a current or previous cancer diagnosis) may consider genetic counseling and testing to clarify this possibility. Relatives may contact the [employer] at [phone] or locate a genetic counselor at [email or web address].     3) We encourage Ms. Drapers relatives to discuss their family history of cancer with their physicians to determine the most appropriate cancer screening recommendations.  Ms. Duglas Brown female relatives may benefit from a formal breast cancer risk assessment by the Ruth Desir or [employer] risk models to determine if additional breast cancer screening is warranted. SUMMARY & PLAN   1) Ms. Erazo's genetic test results are negative meaning there were no clinically significant mutations detected in the 81 genes analyzed. 2) Ms. Vladislav Reed  remaining lifetime risk for breast cancer is 20.0% according to the [employer] risk model. She may consider the NCCN guidelines outlined above for breast cancer surveillance. This includes annual breast MRI screening staggered 6 months apart from annual mammograms. She previously reviewed these recommendations with Dr. Haley Leblanc and was instructed to follow up with him as needed. She plans to continue breast MRI screening ordered by her primary care providers. 3) There are no other changes in medical management for Ms. Erazo based on her negative genetic test results. 4) We encourage Ms. Erazo to contact us every 1-2 years to determine if there are any new genetic testing or research options available. 5) We encourage Ms. Erazo to contact us with updates to her personal and/or familys cancer history as this information may alter our assessment and/or recommendations. The [address] [place] [address] National Program would be glad to offer our assistance should you have any questions or concerns about this information. Please feel free to contact us at [phone]. Bari Nguyễn.  Maria Alarcon MS, [facility]   Licensed Genetic Counselor         CC:  Ms. Gamaliel Pulliam, [address] [facility]

## 2023-02-18 DIAGNOSIS — Z30.09 FAMILY PLANNING: ICD-10-CM

## 2023-02-20 RX ORDER — FOLIC ACID 1 MG/1
1 TABLET ORAL EVERY 6 HOURS
Qty: 120 TABLET | Refills: 3 | Status: SHIPPED | OUTPATIENT
Start: 2023-02-20

## 2023-02-28 DIAGNOSIS — Z30.09 FAMILY PLANNING: ICD-10-CM

## 2023-02-28 RX ORDER — PRENATAL WITH FERROUS FUM AND FOLIC ACID 3080; 920; 120; 400; 22; 1.84; 3; 20; 10; 1; 12; 200; 27; 25; 2 [IU]/1; [IU]/1; MG/1; [IU]/1; MG/1; MG/1; MG/1; MG/1; MG/1; MG/1; UG/1; MG/1; MG/1; MG/1; MG/1
TABLET ORAL
Qty: 90 TABLET | Refills: 0 | Status: SHIPPED | OUTPATIENT
Start: 2023-02-28

## 2023-03-20 ENCOUNTER — OFFICE VISIT (OUTPATIENT)
Dept: OBGYN CLINIC | Age: 43
End: 2023-03-20
Payer: COMMERCIAL

## 2023-03-20 ENCOUNTER — HOSPITAL ENCOUNTER (OUTPATIENT)
Age: 43
Setting detail: SPECIMEN
Discharge: HOME | End: 2023-03-20

## 2023-03-20 VITALS
SYSTOLIC BLOOD PRESSURE: 122 MMHG | BODY MASS INDEX: 25.74 KG/M2 | DIASTOLIC BLOOD PRESSURE: 74 MMHG | HEIGHT: 67 IN | WEIGHT: 164 LBS

## 2023-03-20 DIAGNOSIS — Z12.31 ENCOUNTER FOR SCREENING MAMMOGRAM FOR MALIGNANT NEOPLASM OF BREAST: ICD-10-CM

## 2023-03-20 DIAGNOSIS — N94.10 DYSPAREUNIA IN FEMALE: ICD-10-CM

## 2023-03-20 DIAGNOSIS — R10.2 PELVIC PAIN: ICD-10-CM

## 2023-03-20 DIAGNOSIS — Z01.419 WELL WOMAN EXAM: Primary | ICD-10-CM

## 2023-03-20 LAB
BILIRUBIN URINE: NEGATIVE
CANDIDA SPECIES, DNA PROBE: NEGATIVE
COLOR: YELLOW
COMMENT UA: NORMAL
GARDNERELLA VAGINALIS, DNA PROBE: NEGATIVE
GLUCOSE UR STRIP.AUTO-MCNC: NEGATIVE MG/DL
KETONES UR STRIP.AUTO-MCNC: NEGATIVE MG/DL
LEUKOCYTE ESTERASE UR QL STRIP.AUTO: NEGATIVE
NITRITE UR QL STRIP.AUTO: NEGATIVE
PROT UR STRIP.AUTO-MCNC: 6.5 MG/DL (ref 5–8)
PROT UR STRIP.AUTO-MCNC: NEGATIVE MG/DL
SOURCE: NORMAL
SPECIFIC GRAVITY UA: 1.02 (ref 1–1.03)
TRICHOMONAS VAGINALIS DNA: NEGATIVE
TURBIDITY: CLEAR
URINE HGB: NEGATIVE
UROBILINOGEN, URINE: NORMAL

## 2023-03-20 PROCEDURE — G8484 FLU IMMUNIZE NO ADMIN: HCPCS | Performed by: NURSE PRACTITIONER

## 2023-03-20 PROCEDURE — 99396 PREV VISIT EST AGE 40-64: CPT | Performed by: NURSE PRACTITIONER

## 2023-03-20 ASSESSMENT — PATIENT HEALTH QUESTIONNAIRE - PHQ9
SUM OF ALL RESPONSES TO PHQ QUESTIONS 1-9: 0
SUM OF ALL RESPONSES TO PHQ9 QUESTIONS 1 & 2: 0
SUM OF ALL RESPONSES TO PHQ QUESTIONS 1-9: 0
1. LITTLE INTEREST OR PLEASURE IN DOING THINGS: 0
SUM OF ALL RESPONSES TO PHQ QUESTIONS 1-9: 0
2. FEELING DOWN, DEPRESSED OR HOPELESS: 0
SUM OF ALL RESPONSES TO PHQ QUESTIONS 1-9: 0

## 2023-03-20 NOTE — PROGRESS NOTES
tablet by mouth every 6 hours as needed for Pain 30 tablet 0    DICLOFENAC PO Take 25 mg by mouth daily Takes PRN only for headaches      tiZANidine (ZANAFLEX) 4 MG tablet Take 1 tablet by mouth every 12 hours as needed (for muscle spasm) 60 tablet 0    loratadine (CLARITIN) 10 MG tablet Take 10 mg by mouth daily      meclizine (ANTIVERT) 12.5 MG tablet Take 12.5 mg by mouth 3 times daily as needed      butalbital-acetaminophen-caffeine (FIORICET, ESGIC) -40 MG per tablet Take 1 tablet by mouth every 4 hours as needed for Headaches       Current Facility-Administered Medications   Medication Dose Route Frequency Provider Last Rate Last Admin    leuprolide (LUPRON) injection 3.75 mg  3.75 mg IntraMUSCular Once YESSENIA Miller - CNP         Facility-Administered Medications Ordered in Other Visits   Medication Dose Route Frequency Provider Last Rate Last Admin    lactated ringers infusion   IntraVENous Continuous Bi Hassan MD        sodium chloride flush 0.9 % injection 10 mL  10 mL IntraVENous 2 times per day Stuart Nagy MD        sodium chloride flush 0.9 % injection 10 mL  10 mL IntraVENous PRN Stuart Nagy MD        ceFAZolin (ANCEF) 1 g IVPB 50mL minibag D5W  1 g IntraVENous Once Tg Deal MD               ALLERGIES:  Allergies as of 03/20/2023 - Fully Reviewed 03/20/2023   Allergen Reaction Noted    Vicodin [hydrocodone-acetaminophen] Shortness Of Breath 03/04/2015    Hydrocodone  05/01/2015    Tape [adhesive tape]  05/08/2018    Bactrim [sulfamethoxazole-trimethoprim] Nausea And Vomiting 06/13/2016       Symptoms of decreased mood absent  Symptoms of anhedonia absent    **If either question is answered in a  positive fashion then complete the PHQ9 Scoring Evaluation and make the appropriate referral**      Immunization status: stated as current, but no records available.       Gynecologic History:  Menarche: 9 yo  Menopause at [address] [facility] [address] yo     Patient's last menstrual period was

## 2023-03-21 LAB
C TRACH DNA SPEC QL PROBE+SIG AMP: NEGATIVE
N GONORRHOEA DNA SPEC QL PROBE+SIG AMP: NEGATIVE
SPECIMEN DESCRIPTION: NORMAL

## 2023-04-17 ENCOUNTER — HOSPITAL ENCOUNTER (OUTPATIENT)
Dept: WOMENS IMAGING | Age: 43
Discharge: HOME | End: 2023-04-19
Payer: COMMERCIAL

## 2023-04-17 DIAGNOSIS — Z12.31 ENCOUNTER FOR SCREENING MAMMOGRAM FOR MALIGNANT NEOPLASM OF BREAST: ICD-10-CM

## 2023-04-17 PROCEDURE — 77063 BREAST TOMOSYNTHESIS BI: CPT

## 2023-04-18 ENCOUNTER — TELEPHONE (OUTPATIENT)
Dept: OBGYN CLINIC | Age: 43
End: 2023-04-18

## 2023-04-18 DIAGNOSIS — N64.89 ASYMMETRICAL BREASTS: Primary | ICD-10-CM

## 2023-04-18 NOTE — TELEPHONE ENCOUNTER
----- Message from YESSENIA Moreau CNP sent at 4/18/2023  7:53 AM EDT -----  Focal asymmetry noted in outer left breast.  Recommend additional imaging with left breast diagnostic mammogram and left breast ultrasound.   Please let patient know and order

## 2023-04-18 NOTE — TELEPHONE ENCOUNTER
Per Ami Omalley NP, pt notified of focal asymmetry of left breast with need for diagnostic mammogram and US. Pt verbalized understanding. Orders in epic.

## 2023-05-15 ENCOUNTER — HOSPITAL ENCOUNTER (OUTPATIENT)
Dept: WOMENS IMAGING | Age: 43
Discharge: HOME | End: 2023-05-17
Payer: COMMERCIAL

## 2023-05-15 DIAGNOSIS — N64.89 ASYMMETRICAL BREASTS: ICD-10-CM

## 2023-05-15 PROCEDURE — G0279 TOMOSYNTHESIS, MAMMO: HCPCS

## 2023-05-15 PROCEDURE — 76642 ULTRASOUND BREAST LIMITED: CPT

## 2023-05-30 DIAGNOSIS — Z30.09 FAMILY PLANNING: ICD-10-CM

## 2023-05-30 RX ORDER — PNV,CALCIUM 72/IRON/FOLIC ACID 27 MG-1 MG
TABLET ORAL
Qty: 90 TABLET | Refills: 12 | Status: SHIPPED | OUTPATIENT
Start: 2023-05-30

## 2023-06-20 DIAGNOSIS — Z30.09 FAMILY PLANNING: ICD-10-CM

## 2023-06-20 RX ORDER — FOLIC ACID 1 MG/1
1 TABLET ORAL EVERY 6 HOURS
Qty: 120 TABLET | Refills: 3 | Status: SHIPPED | OUTPATIENT
Start: 2023-06-20

## (undated) DEVICE — SUTURE ETHLN SZ 2-0 L18IN NONABSORBABLE BLK L26MM FS 3/8 664G

## (undated) DEVICE — SCISSOR SURG CRV ENDOCUT TIP FOR LAP DISP

## (undated) DEVICE — GLOVE ORANGE PI 7 1/2   MSG9075

## (undated) DEVICE — SUTURE MCRYL + SZ 4-0 L27IN ABSRB UD L19MM PS-2 3/8 CIR MCP426H

## (undated) DEVICE — SYRINGE,EAR/ULCER, 2 OZ, STERILE: Brand: MEDLINE

## (undated) DEVICE — GLOVE SURG SZ 8 L12IN FNGR THK75MIL WHT LTX POLYMER BEAD

## (undated) DEVICE — TOTAL TRAY, DB, 100% SILI FOLEY, 16FR 10: Brand: MEDLINE

## (undated) DEVICE — STRAP,POSITIONING,KNEE/BODY,FOAM,4X60": Brand: MEDLINE

## (undated) DEVICE — GAUZE,SPONGE,4"X4",16PLY,XRAY,STRL,LF: Brand: MEDLINE

## (undated) DEVICE — TROCAR: Brand: KII FIOS FIRST ENTRY

## (undated) DEVICE — STANDARD HYPODERMIC NEEDLE,POLYPROPYLENE HUB: Brand: MONOJECT

## (undated) DEVICE — SOLUTION IV IRRIG WATER 1000ML POUR BRL 2F7114

## (undated) DEVICE — 3M™ STERI-STRIP™ WOUND CLOSURE SYSTEMS 5 EACH/PACK 25 PACKS/CARTON 4 CARTONS/CASE W8516: Brand: 3M™ STERI-STRIP™

## (undated) DEVICE — GOWN,AURORA,NONREINFORCED,LARGE: Brand: MEDLINE

## (undated) DEVICE — SUTURE VCRL + SZ 4-0 L27IN ABSRB WHT FS-2 3/8 CIR REV CUT VCP422H

## (undated) DEVICE — SYRINGE, LUER LOCK, 10ML: Brand: MEDLINE

## (undated) DEVICE — 3M™ TEGADERM™ TRANSPARENT FILM DRESSING FRAME STYLE, 1626W, 4 IN X 4-3/4 IN (10 CM X 12 CM), 50/CT 4CT/CASE: Brand: 3M™ TEGADERM™

## (undated) DEVICE — MEDI-VAC YANKAUER SUCTION HANDLE W/BULBOUS TIP: Brand: CARDINAL HEALTH

## (undated) DEVICE — DRESSING TRNSPAR W5XL4.5IN FLM SHT SEMIPERMEABLE WIND

## (undated) DEVICE — SOLUTION ANTIFOG VIS SYS CLEARIFY LAPSCP

## (undated) DEVICE — ST CHARLES MINOR ABDOMINAL PK: Brand: MEDLINE INDUSTRIES, INC.

## (undated) DEVICE — SPONGE GZ W4XL4IN RAYON POLY FILL CVR W/ NONWOVEN FAB

## (undated) DEVICE — TUBING, SUCTION, 3/16" X 10', STRAIGHT: Brand: MEDLINE

## (undated) DEVICE — MANIPULATOR INJECTOR ENDO ZUMI 4.5 1151

## (undated) DEVICE — YANKAUER,POOLE TIP,STERILE,50/CS: Brand: MEDLINE

## (undated) DEVICE — SHEARS ENDOSCP L36CM DIA5MM ULTRASONIC CRV TIP ADAPTIVE

## (undated) DEVICE — SUTURE VCRL + SZ 4-0 L18IN ABSRB UD L19MM PS-2 3/8 CIR PRIM VCP496H

## (undated) DEVICE — SUTURE VCRL + SZ 3-0 L27IN ABSRB UD L26MM SH 1/2 CIR VCP416H

## (undated) DEVICE — SUTURE VCRL + SZ 2-0 L27IN ABSRB CLR CT-1 1/2 CIR TAPERCUT VCP259H

## (undated) DEVICE — STRIP,CLOSURE,WOUND,MEDI-STRIP,1/2X4: Brand: MEDLINE

## (undated) DEVICE — Device

## (undated) DEVICE — 1016 S-DRAPE IRRIG POUCH 10/BOX: Brand: STERI-DRAPE™

## (undated) DEVICE — TROCARS: Brand: KII® BLUNT TIP ACCESS SYSTEM

## (undated) DEVICE — SHEET, T, LAPAROTOMY, STERILE: Brand: MEDLINE

## (undated) DEVICE — ELECTROSURGICAL PENCIL BUTTON SWITCH E-Z CLEAN COATED BLADE ELECTRODE 10 FT (3 M) CORD HOLSTER: Brand: MEGADYNE

## (undated) DEVICE — 60 ML SYRINGE LUER-LOCK TIP: Brand: MONOJECT

## (undated) DEVICE — SUTURE VCRL + SZ 0 L27IN ABSRB VLT L26MM UR-6 5/8 CIR VCP603H

## (undated) DEVICE — PENCIL ES L3M BTTN SWCH HOLSTER W/ BLDE ELECTRD EDGE

## (undated) DEVICE — GOWN,SIRUS,NONRNF,SETINSLV,XL,20/CS: Brand: MEDLINE

## (undated) DEVICE — COVER,MAYO STAND,STERILE: Brand: MEDLINE

## (undated) DEVICE — SKIN AFFIX SURG ADHESIVE 72/CS 0.55ML: Brand: MEDLINE

## (undated) DEVICE — CATHETER SUCT TR FL TIP 14FR W/ O CTRL

## (undated) DEVICE — SUTURE PROL SZ 5-0 L18IN NONABSORBABLE BLU L16MM PS-3 3/8 8681G

## (undated) DEVICE — TROCAR: Brand: KII® SLEEVE

## (undated) DEVICE — 3M™ WARMING BLANKET, UPPER BODY, 10 PER CASE, 42268: Brand: BAIR HUGGER™

## (undated) DEVICE — ST CHARLES GYN LAPAROSCOPY PK: Brand: MEDLINE INDUSTRIES, INC.

## (undated) DEVICE — SINGLE PORT MANIFOLD: Brand: NEPTUNE 2

## (undated) DEVICE — CHLORAPREP 26ML ORANGE

## (undated) DEVICE — GLOVE ORANGE PI 7   MSG9070